# Patient Record
Sex: MALE | Race: WHITE | Employment: OTHER | URBAN - METROPOLITAN AREA
[De-identification: names, ages, dates, MRNs, and addresses within clinical notes are randomized per-mention and may not be internally consistent; named-entity substitution may affect disease eponyms.]

---

## 2020-11-03 ENCOUNTER — PATIENT OUTREACH (OUTPATIENT)
Dept: CARE COORDINATION | Facility: CLINIC | Age: 70
End: 2020-11-03

## 2020-11-03 NOTE — PROGRESS NOTES
Clinical Product Navigator RN reviewed chart; patient on payer product coverage.  Review results: noted patient is overdue for preventive care visit and care gaps.       UT/Voicemail    Clinical Data: Care Coordinator Outreach  Outreach attempted x 2.  Left message on patient's voicemail with call back information and requested return call.  Plan: Care Coordinator will send care coordination introduction letter with care coordinator contact information and explanation of care coordination services via mail. Care Coordinator will try to reach patient again in 3-5 business days.    Edna Kong RN/Clinical Product Navigator

## 2020-11-03 NOTE — LETTER
[facility]  [address]  [facility] [address]    November 3, 2020    Royal Adriano Duckworth  [address] [facility] [address]      Dear ,    I am a clinical product navigator that works on behalf of [employer] as a liaison between our clinical care teams and insurance health plans.  Our goal is to assure our patients have access to all of their primary and specialty care needs as well as additional system resources, such as: Diabetes Education, Medication Therapy Management, and Clinic Care Coordination, among other resources.     We noticed that you are due for your annual wellness visit.  Even if you are feeling well, we encourage you to have an annual check up and assure your health care needs stay on track.      Please contact the clinic at your convenience to schedule your annual wellness exam; if you are interested  or in need of establishing care with any specialty providers or resources including those described above, I am happy to assist you.    I look forward to helping you connect with providers within our health care system; please let me know if you have any questions.     Sincerely,    Edna Kong RN   Clinical Product Navigator  PH: [phone]

## 2021-04-26 ENCOUNTER — IMMUNIZATION (OUTPATIENT)
Dept: NURSING | Facility: CLINIC | Age: 71
End: 2021-04-26
Payer: COMMERCIAL

## 2021-04-26 PROCEDURE — 0001A PR COVID VAC PFIZER DIL RECON 30 MCG/0.3 ML IM: CPT

## 2021-04-26 PROCEDURE — 91300 PR COVID VAC PFIZER DIL RECON 30 MCG/0.3 ML IM: CPT

## 2021-05-17 ENCOUNTER — IMMUNIZATION (OUTPATIENT)
Dept: NURSING | Facility: CLINIC | Age: 71
End: 2021-05-17
Attending: INTERNAL MEDICINE
Payer: COMMERCIAL

## 2021-05-17 PROCEDURE — 91300 PR COVID VAC PFIZER DIL RECON 30 MCG/0.3 ML IM: CPT

## 2021-05-17 PROCEDURE — 0002A PR COVID VAC PFIZER DIL RECON 30 MCG/0.3 ML IM: CPT

## 2021-05-23 ENCOUNTER — HEALTH MAINTENANCE LETTER (OUTPATIENT)
Age: 71
End: 2021-05-23

## 2021-07-05 ENCOUNTER — OFFICE VISIT (OUTPATIENT)
Dept: URGENT CARE | Facility: URGENT CARE | Age: 71
End: 2021-07-05
Payer: COMMERCIAL

## 2021-07-05 VITALS
DIASTOLIC BLOOD PRESSURE: 72 MMHG | SYSTOLIC BLOOD PRESSURE: 126 MMHG | HEIGHT: 72 IN | TEMPERATURE: 98.4 F | WEIGHT: 175 LBS | BODY MASS INDEX: 23.7 KG/M2 | HEART RATE: 80 BPM

## 2021-07-05 DIAGNOSIS — L72.3 INFLAMED SEBACEOUS CYST: Primary | ICD-10-CM

## 2021-07-05 PROCEDURE — 99203 OFFICE O/P NEW LOW 30 MIN: CPT | Performed by: PHYSICIAN ASSISTANT

## 2021-07-05 RX ORDER — SACCHAROMYCES BOULARDII 250 MG
250 CAPSULE ORAL 2 TIMES DAILY
Qty: 60 CAPSULE | Refills: 0 | Status: SHIPPED | OUTPATIENT
Start: 2021-07-05 | End: 2021-08-04

## 2021-07-05 RX ORDER — CEPHALEXIN 500 MG/1
500 CAPSULE ORAL 3 TIMES DAILY
Qty: 21 CAPSULE | Refills: 0 | Status: SHIPPED | OUTPATIENT
Start: 2021-07-05 | End: 2021-07-12

## 2021-07-05 ASSESSMENT — ENCOUNTER SYMPTOMS
COLOR CHANGE: 1
CONSTITUTIONAL NEGATIVE: 1
WOUND: 1

## 2021-07-05 ASSESSMENT — MIFFLIN-ST. JEOR: SCORE: 1583.85

## 2021-07-05 NOTE — PROGRESS NOTES
"  Assessment & Plan   Problem List Items Addressed This Visit     None      Visit Diagnoses     Inflamed sebaceous cyst    -  Primary    Relevant Medications    cephALEXin (KEFLEX) 500 MG capsule    saccharomyces boulardii (FLORASTOR) 250 MG capsule         Take probiotic or yogurt (I recommend Greek yogurt) with this. Continue to apply moist heat to the area. Alternate Tylenol and Ibuprofen as needed for pain.        Tobacco Cessation:   reports that he has been smoking cigarettes. He has been smoking about 1.00 pack per day. He has never used smokeless tobacco.  Tobacco Cessation Action Plan: Shared Medical Visit / Group Education      Return in about 1 week (around 7/12/2021), or if symptoms worsen or fail to improve.    Krishna Molina PA-C  [facility]    is a 71 year old who presents for the following health issues  Patient presents with lump on neck. He reports in the past 2-3 days, the lump has become tender. He reports history of cysts. He has tried a heat pack to it which offered minimal relief.      Review of Systems   Constitutional: Negative.    Skin: Positive for color change and wound.          Objective    /72   Pulse 80   Temp 98.4  F (36.9  C) (Oral)   Ht 1.816 m (5' 11.5\")   Wt 79.4 kg (175 lb)   BMI 24.07 kg/m    Body mass index is 24.07 kg/m .  Physical Exam  Constitutional:       Appearance: Normal appearance.   HENT:      Head: Normocephalic and atraumatic.   Neck:      Musculoskeletal: Normal range of motion and neck supple.   Skin:     General: Skin is warm.      Findings: Abscess and erythema present.             Comments: Erythematous, firm to touch about 1-2 cm beyond central abscess   Neurological:      Mental Status: He is alert.            "

## 2021-07-05 NOTE — PATIENT INSTRUCTIONS
Take probiotic or yogurt (I recommend Greek yogurt) with this. Continue to apply moist heat to the area. Alternate Tylenol and Ibuprofen as needed for pain.

## 2021-09-12 ENCOUNTER — HEALTH MAINTENANCE LETTER (OUTPATIENT)
Age: 71
End: 2021-09-12

## 2021-12-28 ENCOUNTER — IMMUNIZATION (OUTPATIENT)
Dept: NURSING | Facility: CLINIC | Age: 71
End: 2021-12-28
Payer: COMMERCIAL

## 2021-12-28 PROCEDURE — 0004A PR COVID VAC PFIZER DIL RECON 30 MCG/0.3 ML IM: CPT

## 2021-12-28 PROCEDURE — 91300 PR COVID VAC PFIZER DIL RECON 30 MCG/0.3 ML IM: CPT

## 2022-06-19 ENCOUNTER — HEALTH MAINTENANCE LETTER (OUTPATIENT)
Age: 72
End: 2022-06-19

## 2022-10-14 ENCOUNTER — LAB (OUTPATIENT)
Dept: LAB | Facility: CLINIC | Age: 72
End: 2022-10-14
Payer: COMMERCIAL

## 2022-10-14 ENCOUNTER — OFFICE VISIT (OUTPATIENT)
Dept: FAMILY MEDICINE | Facility: CLINIC | Age: 72
End: 2022-10-14
Payer: COMMERCIAL

## 2022-10-14 VITALS
HEART RATE: 85 BPM | OXYGEN SATURATION: 98 % | SYSTOLIC BLOOD PRESSURE: 132 MMHG | DIASTOLIC BLOOD PRESSURE: 86 MMHG | BODY MASS INDEX: 24.55 KG/M2 | TEMPERATURE: 98.3 F | WEIGHT: 178.5 LBS

## 2022-10-14 DIAGNOSIS — Z83.3 FAMILY HISTORY OF DIABETES MELLITUS: ICD-10-CM

## 2022-10-14 DIAGNOSIS — Z13.220 SCREENING CHOLESTEROL LEVEL: ICD-10-CM

## 2022-10-14 DIAGNOSIS — Z12.11 SCREEN FOR COLON CANCER: Primary | ICD-10-CM

## 2022-10-14 DIAGNOSIS — L98.9 SKIN LESION: ICD-10-CM

## 2022-10-14 LAB — HBA1C MFR BLD: 5.3 % (ref 0–5.6)

## 2022-10-14 PROCEDURE — 80061 LIPID PANEL: CPT

## 2022-10-14 PROCEDURE — 36415 COLL VENOUS BLD VENIPUNCTURE: CPT

## 2022-10-14 PROCEDURE — 83036 HEMOGLOBIN GLYCOSYLATED A1C: CPT

## 2022-10-14 PROCEDURE — 99213 OFFICE O/P EST LOW 20 MIN: CPT

## 2022-10-14 ASSESSMENT — PAIN SCALES - GENERAL: PAINLEVEL: NO PAIN (0)

## 2022-10-14 NOTE — PROGRESS NOTES
Assessment & Plan     Screen for colon cancer  I advised patient about the recommendation for a colonoscopy after a positive fecal occult blood test.  Patient expressed a preference for watching and waiting over a colonoscopy.  I further advised him about the risks of deferring a colonoscopy till symptoms appear. Patient still expressed preference for watching and waiting approach. I advised him about the warning signs of colon cancer including a sudden change in bowel habits blood or blood in the stool.    Skin lesion  Unclear etiology for skin lesion.  I have a low suspicion for malignant lesion due to uniform color.  Differentials include cyst, seborrheic keratosis, actinic keratosis, or nevi.  Referral to Derm for further management  - Adult Dermatology Referral; Future    Family history of diabetes mellitus  - Hemoglobin A1c; Future    Screening cholesterol level  - Lipid panel reflex to direct LDL Non-fasting; Future  56}  Return in about 3 months (around 1/14/2023).    Wayne Miguel NP  [facility] KATLIN Harrington is a 72 year old presenting for the following health issues:  Results    Had positive screening on the Feccal occult blood test. He is wondering about the follow up for this, and states he would prefer not to do a colonoscopy.    Skin marks on face. History of moles. Been around for 5-6 months. Itching on the nose lesion. 2 small cysts removed on neck within 5 years. Hasn't changed over 5-6 months. Hasn't tried any OTCs. History of sun exposure as a child.    A1c 5.9% according to biometric labs performed by home health nurse. He reports that he doesn't remember blood getting drawn and would like his blood rechecked today.    No chest pain, shortness of breath, lightheadedness.  History of Raynauds and he reports that bp is typically worse in the right arm.     History of Present Illness       Reason for visit:  Follow Up on Results    He eats 0-1 servings of fruits  and vegetables daily.He consumes 0 sweetened beverage(s) daily. He exercises with enough effort to increase his heart rate 3 or less days per week.        Review of Systems   Constitutional, HEENT, cardiovascular, pulmonary, gi and gu systems are negative, except as otherwise noted.      Objective    /86 (BP Location: Left arm, Patient Position: Sitting, Cuff Size: Adult Regular)   Pulse 85   Temp 98.3  F (36.8  C) (Temporal)   Wt 81 kg (178 lb 8 oz)   SpO2 98%   BMI 24.55 kg/m    Body mass index is 24.55 kg/m .  Physical Exam   GENERAL: healthy, alert and no distress  RESP: lungs clear to auscultation - no rales, rhonchi or wheezes  CV:  regular rate and rhythm, normal S1 S2, no S3 or S4, no murmur, click or rub, no peripheral edema and peripheral pulses strong. No carotid bruits.  MS: no gross musculoskeletal defects noted, no edema  SKIN: Approximately 3 mm papule on left nasal bridge: Appears uniform and white in color.  1 mm papule on left cheek white in color and uniform shape.  NEURO: Normal strength and tone, mentation intact and speech normal  PSYCH: mentation appears normal, affect normal/bright

## 2022-10-14 NOTE — PATIENT INSTRUCTIONS
Watch for change in bowel habits: constipation, diarrhea, blood in poop.     Choose complex carbs over single carbs: whole grains, brown rice, beans, vegetables.

## 2022-10-15 LAB
CHOLEST SERPL-MCNC: 184 MG/DL
FASTING STATUS PATIENT QL REPORTED: YES
HDLC SERPL-MCNC: 60 MG/DL
LDLC SERPL CALC-MCNC: 103 MG/DL
NONHDLC SERPL-MCNC: 124 MG/DL
TRIGL SERPL-MCNC: 105 MG/DL

## 2022-11-19 ENCOUNTER — HEALTH MAINTENANCE LETTER (OUTPATIENT)
Age: 72
End: 2022-11-19

## 2023-01-25 ENCOUNTER — TELEPHONE (OUTPATIENT)
Dept: FAMILY MEDICINE | Facility: CLINIC | Age: 73
End: 2023-01-25

## 2023-01-25 NOTE — TELEPHONE ENCOUNTER
Patient Quality Outreach    Patient is due for the following:   Colon Cancer Screening  Physical Annual Wellness Visit      Topic Date Due     Pneumococcal Vaccine (1 - PCV) Never done     Zoster (Shingles) Vaccine (1 of 2) Never done     COVID-19 Vaccine (5 - Booster for Pfizer series) 02/22/2022     Flu Vaccine (1) Never done       Next Steps:   Schedule a Annual Wellness Visit    Type of outreach:    Sent [facility] message.    Next Steps:  Reach out within 90 days via Letter.    Max number of attempts reached: No. Will try again in 90 days if patient still on fail list.    Questions for provider review:    None     NIRU CEDENO, CHONG  Chart routed to Care Team.

## 2023-03-23 ENCOUNTER — OFFICE VISIT (OUTPATIENT)
Dept: DERMATOLOGY | Facility: CLINIC | Age: 73
End: 2023-03-23
Payer: COMMERCIAL

## 2023-03-23 DIAGNOSIS — L57.0 ACTINIC KERATOSIS: Primary | ICD-10-CM

## 2023-03-23 DIAGNOSIS — L82.1 SEBORRHEIC KERATOSES: ICD-10-CM

## 2023-03-23 DIAGNOSIS — L81.4 SOLAR LENTIGO: ICD-10-CM

## 2023-03-23 PROCEDURE — 99203 OFFICE O/P NEW LOW 30 MIN: CPT | Mod: GC | Performed by: STUDENT IN AN ORGANIZED HEALTH CARE EDUCATION/TRAINING PROGRAM

## 2023-03-23 ASSESSMENT — PAIN SCALES - GENERAL: PAINLEVEL: NO PAIN (0)

## 2023-03-23 NOTE — PROGRESS NOTES
[facility] Dermatology Note  Encounter Date: Mar 23, 2023  Office Visit     Dermatology Problem List:  1. AKs, forehead  2. Seborrheic keratoses  ____________________________________________    Assessment & Plan:     # Seborrheic keratosis, left cheek  - Discussed the natural history and benign nature of these lesions. Reassurance provided that no additional treatment is necessary, however cryotherapy can be performed if inflamed/irritated or bothersome to patient.     # Actinic keratosis  Discussed that these are pre-cancerous lesions and 10% of them will go on to become non-melanoma skin cancers over their next 10 years of life, which is why we opt treat them.   - Cryotherapy offered to the patient today however he was hesitant about this, therefore will re-assess at follow up skin check  - Encouraged daily sun protection with SPF 30+ and UPF clothing    # Solar lentigines  Discussed that these are benign pigmented macules due to ultraviolet radiation exposure, usually from the sun. These are commonly found on fair-skinned individuals. Sun protection with SPF 30+, UPF clothing, seeking shade and tanning bed avoidance is important to avoid development of new lesions. Patient informed to notify us of any changes to these lesions.     Procedures Performed:   None    Follow-up: in 3-6 months for skin check, sooner as needed for new or changing lesions    Staff and Resident:     Helen Hess DO (PGY-4)  Dermatology Resident  [facility]    Staff: Dr. Rj Rene  ____________________________________________    CC: Derm Problem (Spot of concern on the nose (itchy, was there for several months but it disappeared now) and left cheek (white, odd mole.  )    HPI:  Mr. Royal Adriano Duckworth is a(n) 73 year old male who presents today as a new patient for evaluation of a lesion on the left side of his nose under where his glasses typically sit.  He initially noticed it 3 to 4 months ago and  states that it was itchy but never painful and never bled and about a month ago it flaked off on its own and has not recurred.  He was not concerned by this but was referred to us by his PCP.  He also wanted us to evaluate a spot on the left cheek that was white in color however is also asymptomatic.  He denies any family history of skin cancer, specifically melanoma and also denies any personal history of skin cancer.    Patient is otherwise feeling well, without additional skin concerns.    Labs Reviewed:  N/A    Physical Exam:  Vitals: There were no vitals taken for this visit.  SKIN: Focused examination of the face was performed.  - There are erythematous macules with overyling adherent scale on the upper forehead.   - Scattered brown macules on sun exposed areas of the face.   - There is a waxy stuck on light tan papule on the left malar cheek.   - No other lesions of concern on areas examined.     Medications:  Current Outpatient Medications   Medication     VIAGRA 100 MG OR TABS     No current facility-administered medications for this visit.      Past Medical History:   Patient Active Problem List   Diagnosis     Impotence of organic origin     CARDIOVASCULAR SCREENING; LDL GOAL LESS THAN 160     Advanced directives, counseling/discussion     Past Medical History:   Diagnosis Date     NO ACTIVE PROBLEMS        NORRIS Miguel NP  [address] [place] [address] on close of this encounter.

## 2023-03-23 NOTE — LETTER
3/23/2023       RE: Royal Adriano Duckworth  [address]  [facility] [address]     Dear Colleague,    Thank you for referring your patient, Royal Adriano Duckworth, to the [facility] DERMATOLOG[facility] [place] [facility]. Please see a copy of my visit note below.    [facility] Dermatology Note  Encounter Date: Mar 23, 2023  Office Visit     Dermatology Problem List:  1. AKs, forehead  2. Seborrheic keratoses  ____________________________________________    Assessment & Plan:     # Seborrheic keratosis, left cheek  - Discussed the natural history and benign nature of these lesions. Reassurance provided that no additional treatment is necessary, however cryotherapy can be performed if inflamed/irritated or bothersome to patient.     # Actinic keratosis  Discussed that these are pre-cancerous lesions and 10% of them will go on to become non-melanoma skin cancers over their next 10 years of life, which is why we opt treat them.   - Cryotherapy offered to the patient today however he was hesitant about this, therefore will re-assess at follow up skin check  - Encouraged daily sun protection with SPF 30+ and UPF clothing    # Solar lentigines  Discussed that these are benign pigmented macules due to ultraviolet radiation exposure, usually from the sun. These are commonly found on fair-skinned individuals. Sun protection with SPF 30+, UPF clothing, seeking shade and tanning bed avoidance is important to avoid development of new lesions. Patient informed to notify us of any changes to these lesions.     Procedures Performed:   None    Follow-up: in 3-6 months for skin check, sooner as needed for new or changing lesions    Staff and Resident:     Helen Hess DO (PGY-4)  Dermatology Resident  [facility]    Staff: Dr. Rj Rene  ____________________________________________    CC: Derm Problem (Spot of concern on the nose  (itchy, was there for several months but it disappeared now) and left cheek (white, odd mole.  )    HPI:  Mr. Royal Adriano Duckworth is a(n) 73 year old male who presents today as a new patient for evaluation of a lesion on the left side of his nose under where his glasses typically sit.  He initially noticed it 3 to 4 months ago and states that it was itchy but never painful and never bled and about a month ago it flaked off on its own and has not recurred.  He was not concerned by this but was referred to us by his PCP.  He also wanted us to evaluate a spot on the left cheek that was white in color however is also asymptomatic.  He denies any family history of skin cancer, specifically melanoma and also denies any personal history of skin cancer.    Patient is otherwise feeling well, without additional skin concerns.    Labs Reviewed:  N/A    Physical Exam:  Vitals: There were no vitals taken for this visit.  SKIN: Focused examination of the face was performed.  - There are erythematous macules with overyling adherent scale on the upper forehead.   - Scattered brown macules on sun exposed areas of the face.   - There is a waxy stuck on light tan papule on the left malar cheek.   - No other lesions of concern on areas examined.     Medications:  Current Outpatient Medications   Medication     VIAGRA 100 MG OR TABS     No current facility-administered medications for this visit.      Past Medical History:   Patient Active Problem List   Diagnosis     Impotence of organic origin     CARDIOVASCULAR SCREENING; LDL GOAL LESS THAN 160     Advanced directives, counseling/discussion     Past Medical History:   Diagnosis Date     NO ACTIVE PROBLEMS        NORRIS Miguel NP  [address] [address] on close of this encounter.    Attestation signed by Rj Rene MD at 3/23/2023  3:35 PM:  I have personally examined this patient and agree with the resident doctor's documentation and plan of care. I have  reviewed and amended the resident's note. The documentation accurately reflects my clinical observations, diagnoses, treatment and follow-up plans.     Rj Rene MD  Dermatology Staff

## 2023-03-23 NOTE — PATIENT INSTRUCTIONS
Patient Education     Checking for Skin Cancer  You can find cancer early by checking your skin each month. There are 3 kinds of skin cancer. They are melanoma, basal cell carcinoma, and squamous cell carcinoma. Doing monthly skin checks is the best way to find new marks or skin changes. Follow the instructions below for checking your skin.   The ABCDEs of checking moles for melanoma   Check your moles or growths for signs of melanoma using ABCDE:   Asymmetry: the sides of the mole or growth don t match  Border: the edges are ragged, notched, or blurred  Color: the color within the mole or growth varies  Diameter: the mole or growth is larger than 6 mm (size of a pencil eraser)  Evolving: the size, shape, or color of the mole or growth is changing (evolving is not shown in the images below)    Checking for other types of skin cancer  Basal cell carcinoma or squamous cell carcinoma have symptoms such as:     A spot or mole that looks different from all other marks on your skin  Changes in how an area feels, such as itching, tenderness, or pain  Changes in the skin's surface, such as oozing, bleeding, or scaliness  A sore that does not heal  New swelling or redness beyond the border of a mole    Who s at risk?  Anyone can get skin cancer. But you are at greater risk if you have:   Fair skin, light-colored hair, or light-colored eyes  Many moles or abnormal moles on your skin  A history of sunburns from sunlight or tanning beds  A family history of skin cancer  A history of exposure to radiation or chemicals  A weakened immune system  If you have had skin cancer in the past, you are at risk for recurring skin cancer.   How to check your skin  Do your monthly skin checkups in front of a full-length mirror. Check all parts of your body, including your:   Head (ears, face, neck, and scalp)  Torso (front, back, and sides)  Arms (tops, undersides, upper, and lower armpits)  Hands (palms, backs, and fingers, including under  the nails)  Buttocks and genitals  Legs (front, back, and sides)  Feet (tops, soles, toes, including under the nails, and between toes)  If you have a lot of moles, take digital photos of them each month. Make sure to take photos both up close and from a distance. These can help you see if any moles change over time.   Most skin changes are not cancer. But if you see any changes in your skin, call your doctor right away. Only he or she can diagnose a problem. If you have skin cancer, seeing your doctor can be the first step toward getting the treatment that could save your life.   [employer] last reviewed this educational content on 4/1/2019 2000-2020 [employer]. [address] [place] [address], [place] [address]. All rights reserved. This information is not intended as a substitute for professional medical care. Always follow your healthcare professional's instructions.       When should I call my doctor?  If you are worsening or not improving, please, contact us or seek urgent care as noted below.     Who should I call with questions (adults)?  [facility] (adult and pediatric): [phone]  [facility] (adult): [phone]  For urgent needs outside of business hours call the [facility] at [phone] and ask for the dermatology resident on call to be paged  If this is a medical emergency and you are unable to reach an ER, Call [phone]    Who should I call with questions (pediatric)?  [facility]- Pediatric Dermatology  Dr. Ramandeep Arroyo, Dr. Simba Hector, Dr. Lamar Bai, CHERELLE Briscoe, Dr. Neena Flynn, Dr. Yarelis Carrillo & Dr. Deangelo Sutton  Non-urgent nurse triage line; [phone]- Fatuma and Radha SCHWARZ Care Coordinators   [place] (/Complex ) [phone]    If you need a prescription refill, please contact your pharmacy. Refills are approved or denied by our Physicians  during normal business hours, Monday through Fridays  Per office policy, refills will not be granted if you have not been seen within the past year (or sooner depending on your child's condition)    Scheduling Information:  Pediatric Appointment Scheduling and Call Center [phone]  Radiology Scheduling- [phone]  Sedation Unit Scheduling- [phone]  [facility] Scheduling- General [phone]; Pediatric Dermatology [phone]  Main  Services: [phone]  Bolivian: [phone]  Jordanian: [phone]  Hmong/Alejandro/Deandre: [phone]  Preadmission Nursing Department Fax Number: [phone] (Fax all pre-operative paperwork to this number)    For urgent matters arising during evenings, weekends, or holidays that cannot wait for normal business hours please call [phone] and ask for the dermatology resident on call to be paged.

## 2023-03-23 NOTE — NURSING NOTE
Dermatology Rooming Note    Royal Adriano Duckworth's goals for this visit include:   Chief Complaint   Patient presents with     Derm Problem     Spot of concern on the nose (itchy, was there for several months but it disappeared now) and left cheek (white, odd mole.       Cristina Carter, CMA

## 2023-07-01 ENCOUNTER — HEALTH MAINTENANCE LETTER (OUTPATIENT)
Age: 73
End: 2023-07-01

## 2023-07-18 ENCOUNTER — OFFICE VISIT (OUTPATIENT)
Dept: DERMATOLOGY | Facility: CLINIC | Age: 73
End: 2023-07-18
Payer: COMMERCIAL

## 2023-07-18 DIAGNOSIS — L81.4 LENTIGINES: ICD-10-CM

## 2023-07-18 DIAGNOSIS — L82.1 SEBORRHEIC KERATOSES: ICD-10-CM

## 2023-07-18 DIAGNOSIS — L57.8 ACTINIC SKIN DAMAGE: Primary | ICD-10-CM

## 2023-07-18 PROCEDURE — 99213 OFFICE O/P EST LOW 20 MIN: CPT | Performed by: STUDENT IN AN ORGANIZED HEALTH CARE EDUCATION/TRAINING PROGRAM

## 2023-07-18 NOTE — PROGRESS NOTES
[facility] Dermatology Note    Encounter Date: Jul 18, 2023    Dermatology Problem List:    ______________________________________    Impression/Plan:   was seen today for derm problem.    Diagnoses and all orders for this visit:    Actinic skin damage  Lentigines  - discussed sunprotective behaviors, clothing, and the use of sunscreen    Seborrheic keratoses  - benign          Follow-up PRN.       Staff Involved:  Staff Only    Rj Rene MD   of Dermatology  Department of Dermatology  [facility]      CC:   Chief Complaint   Patient presents with     Derm Problem     AK follow up with area of concern around nose.       History of Present Illness:  Mr. Royal Adriano Duckworth is a 73 year old male who presents as a return patient.    Last seen in march, aks were not frozen after discussion of the low potential for malignant transformation.  Patient presents today for follow-up of his loose lesions.  They seem to have resolved.  He wears sunscreen occasionally    Labs:      Physical exam:  Vitals: There were no vitals taken for this visit.  GEN: well developed, well-nourished, in no acute distress, in a pleasant mood.     SKIN: Frank phototype 2  - Sun-exposed skin, which includes the head/face, neck, both arms, digits, and/or nails was examined.   - Flat brown macules and patches in a sun exposed areas on face and extremities  - Stuck on brown papules on trunk and extremities   - No other lesions of concern on areas examined.     Past Medical History:   Past Medical History:   Diagnosis Date     NO ACTIVE PROBLEMS      Past Surgical History:   Procedure Laterality Date     ZZC APPENDECTOMY         Social History:   reports that he has been smoking cigarettes. He has been smoking an average of 1 pack per day. He has never used smokeless tobacco. He reports current alcohol use. He reports that he does not use drugs.    Family  History:  Family History   Problem Relation Age of Onset     C.A.D. Mother         triple bypass 10yrs ago     Diabetes Mother      Eye Disorder Mother         poss     Respiratory Father         poss lung CA     Diabetes Brother      Melanoma No family hx of      Skin Cancer No family hx of        Medications:  Current Outpatient Medications   Medication Sig Dispense Refill     VIAGRA 100 MG OR TABS 1 TABLET 30 MINUTES PRIOR TO INTERCOURSE 5 5     No Known Allergies

## 2023-07-18 NOTE — NURSING NOTE
Dermatology Rooming Note    Royal Adriano Duckworth's goals for this visit include:   Chief Complaint   Patient presents with     Derm Problem     AK follow up with area of concern around nose.     Brian Adams, EMT-B

## 2023-07-18 NOTE — LETTER
7/18/2023       RE: Royal Adriano Duckworth  [address]  [facility] [address]     Dear Colleague,    Thank you for referring your patient, Royal Adriano Duckworth, to the [facility] DERMATOLOGY [facility] [place] at [facility]. Please see a copy of my visit note below.    [facility] Dermatology Note    Encounter Date: Jul 18, 2023    Dermatology Problem List:    ______________________________________    Impression/Plan:   was seen today for derm problem.    Diagnoses and all orders for this visit:    Actinic skin damage  Lentigines  - discussed sunprotective behaviors, clothing, and the use of sunscreen    Seborrheic keratoses  - benign          Follow-up PRN.       Staff Involved:  Staff Only    Rj Rene MD   of Dermatology  Department of Dermatology  [facility] [employer] [facility]      CC:   Chief Complaint   Patient presents with    Derm Problem     AK follow up with area of concern around nose.       History of Present Illness:  Mr. Royal Adriano Duckworth is a 73 year old male who presents as a return patient.    Last seen in march, aks were not frozen after discussion of the low potential for malignant transformation.  Patient presents today for follow-up of his loose lesions.  They seem to have resolved.  He wears sunscreen occasionally    Labs:      Physical exam:  Vitals: There were no vitals taken for this visit.  GEN: well developed, well-nourished, in no acute distress, in a pleasant mood.     SKIN: Frank phototype 2  - Sun-exposed skin, which includes the head/face, neck, both arms, digits, and/or nails was examined.   - Flat brown macules and patches in a sun exposed areas on face and extremities  - Stuck on brown papules on trunk and extremities   - No other lesions of concern on areas examined.     Past Medical History:   Past Medical History:   Diagnosis Date    NO ACTIVE PROBLEMS       Past Surgical History:   Procedure Laterality Date    ZZC APPENDECTOMY         Social History:   reports that he has been smoking cigarettes. He has been smoking an average of 1 pack per day. He has never used smokeless tobacco. He reports current alcohol use. He reports that he does not use drugs.    Family History:  Family History   Problem Relation Age of Onset    C.A.D. Mother         triple bypass 10yrs ago    Diabetes Mother     Eye Disorder Mother         poss    Respiratory Father         poss lung CA    Diabetes Brother     Melanoma No family hx of     Skin Cancer No family hx of        Medications:  Current Outpatient Medications   Medication Sig Dispense Refill    VIAGRA 100 MG OR TABS 1 TABLET 30 MINUTES PRIOR TO INTERCOURSE 5 5     No Known Allergies

## 2024-05-31 ENCOUNTER — OFFICE VISIT (OUTPATIENT)
Dept: FAMILY MEDICINE | Facility: CLINIC | Age: 74
End: 2024-05-31
Payer: COMMERCIAL

## 2024-05-31 VITALS
OXYGEN SATURATION: 95 % | HEART RATE: 98 BPM | SYSTOLIC BLOOD PRESSURE: 152 MMHG | TEMPERATURE: 98.2 F | DIASTOLIC BLOOD PRESSURE: 80 MMHG | BODY MASS INDEX: 24.92 KG/M2 | RESPIRATION RATE: 20 BRPM | HEIGHT: 71 IN | WEIGHT: 178 LBS

## 2024-05-31 DIAGNOSIS — L08.9 SKIN INFECTION: Primary | ICD-10-CM

## 2024-05-31 DIAGNOSIS — R03.0 ELEVATED BLOOD PRESSURE READING WITHOUT DIAGNOSIS OF HYPERTENSION: ICD-10-CM

## 2024-05-31 PROCEDURE — 87798 DETECT AGENT NOS DNA AMP: CPT | Mod: 90 | Performed by: FAMILY MEDICINE

## 2024-05-31 PROCEDURE — G2211 COMPLEX E/M VISIT ADD ON: HCPCS | Performed by: FAMILY MEDICINE

## 2024-05-31 PROCEDURE — 87077 CULTURE AEROBIC IDENTIFY: CPT | Performed by: FAMILY MEDICINE

## 2024-05-31 PROCEDURE — 87205 SMEAR GRAM STAIN: CPT | Performed by: FAMILY MEDICINE

## 2024-05-31 PROCEDURE — 99214 OFFICE O/P EST MOD 30 MIN: CPT | Performed by: FAMILY MEDICINE

## 2024-05-31 PROCEDURE — 87070 CULTURE OTHR SPECIMN AEROBIC: CPT | Performed by: FAMILY MEDICINE

## 2024-05-31 PROCEDURE — 99000 SPECIMEN HANDLING OFFICE-LAB: CPT | Performed by: FAMILY MEDICINE

## 2024-05-31 RX ORDER — SULFAMETHOXAZOLE/TRIMETHOPRIM 800-160 MG
1 TABLET ORAL 2 TIMES DAILY
Qty: 14 TABLET | Refills: 0 | Status: SHIPPED | OUTPATIENT
Start: 2024-05-31 | End: 2024-06-07

## 2024-05-31 RX ORDER — RESPIRATORY SYNCYTIAL VIRUS VACCINE 120MCG/0.5
0.5 KIT INTRAMUSCULAR ONCE
Qty: 1 EACH | Refills: 0 | Status: CANCELLED | OUTPATIENT
Start: 2024-05-31 | End: 2024-05-31

## 2024-05-31 NOTE — PATIENT INSTRUCTIONS
Bactrim one tablet twice daily for 7 days  Ice pack three to four times daily for 15 minutes  Please follow up in the emergency room if symptoms are not improving in two days or sooner if you experience fever, chills or other concerning symptoms

## 2024-05-31 NOTE — PROGRESS NOTES
Assessment & Plan     Skin infection  - d/d bacterial vs viral vs other   - advised below   - sulfamethoxazole-trimethoprim (BACTRIM DS) 800-160 MG tablet; Take 1 tablet by mouth 2 times daily for 7 days  - Varicella Zoster Virus by PCR Blood Fluid or Tissue; Future  - Skin Aerobic Bacterial Culture Routine With Gram Stain  - Varicella Zoster Virus by PCR Blood Fluid or Tissue    Elevated blood pressure without diagnosis of HTN   - advised to follow up for physical to recheck b/p          Patient Instructions   Bactrim one tablet twice daily for 7 days  Ice pack three to four times daily for 15 minutes  Please follow up in the emergency room if symptoms are not improving in two days or sooner if you experience fever, chills or other concerning symptoms     Humphrey Harrington is a 74 year old, presenting for the following health issues:  Facial Swelling      5/31/2024     3:55 PM   Additional Questions   Roomed by tavia   Accompanied by self     History of Present Illness       Reason for visit:  Dont know, thats what i want to find out, maybe an insect bite or some infection causing swelling. Forehead.    He eats 0-1 servings of fruits and vegetables daily.He consumes 0 sweetened beverage(s) daily.He exercises with enough effort to increase his heart rate 9 or less minutes per day.  He exercises with enough effort to increase his heart rate 3 or less days per week.   He is taking medications regularly.       Yesterday morning he had this round spot on his forehead. Throughout the day it kept feeling moist. Through the night it was draining. Around six am he noticed that forehead was slightly more swollen. He called nurse line and advised to put cold compress. The cold compress stopped the weeping. Weeping was clear. He kind of feels a start of a headache. No fever, chills or lymph node. He is unsure of any insect bite. Lesion is tender.               Objective    There were no vitals taken for this visit.  There is  "no height or weight on file to calculate BMI.  Physical Exam   BP (!) 159/82 (BP Location: Right arm, Patient Position: Sitting, Cuff Size: Adult Regular)   Pulse 98   Temp 98.2  F (36.8  C) (Temporal)   Resp 20   Ht 1.8 m (5' 10.87\")   Wt 80.7 kg (178 lb)   SpO2 95%   BMI 24.92 kg/m     BP (!) 152/80 (BP Location: Right arm, Patient Position: Sitting, Cuff Size: Adult Regular)   Pulse 98   Temp 98.2  F (36.8  C) (Temporal)   Resp 20   Ht 1.8 m (5' 10.87\")   Wt 80.7 kg (178 lb)   SpO2 95%   BMI 24.92 kg/m                           Signed Electronically by: Catherine Li MD    "

## 2024-06-02 LAB
BACTERIA SKIN AEROBE CULT: ABNORMAL
BACTERIA SKIN AEROBE CULT: ABNORMAL
GRAM STAIN RESULT: ABNORMAL
GRAM STAIN RESULT: ABNORMAL

## 2024-06-06 LAB — VZV DNA SPEC QL NAA+PROBE: NOT DETECTED

## 2024-08-24 ENCOUNTER — HEALTH MAINTENANCE LETTER (OUTPATIENT)
Age: 74
End: 2024-08-24

## 2024-12-26 ENCOUNTER — APPOINTMENT (OUTPATIENT)
Dept: CARDIOLOGY | Facility: CLINIC | Age: 74
DRG: 193 | End: 2024-12-26
Payer: COMMERCIAL

## 2024-12-26 ENCOUNTER — HOSPITAL ENCOUNTER (INPATIENT)
Facility: CLINIC | Age: 74
End: 2024-12-26
Attending: EMERGENCY MEDICINE | Admitting: INTERNAL MEDICINE
Payer: COMMERCIAL

## 2024-12-26 ENCOUNTER — APPOINTMENT (OUTPATIENT)
Dept: CT IMAGING | Facility: CLINIC | Age: 74
DRG: 193 | End: 2024-12-26
Attending: EMERGENCY MEDICINE
Payer: COMMERCIAL

## 2024-12-26 ENCOUNTER — APPOINTMENT (OUTPATIENT)
Dept: GENERAL RADIOLOGY | Facility: CLINIC | Age: 74
DRG: 193 | End: 2024-12-26
Payer: COMMERCIAL

## 2024-12-26 DIAGNOSIS — J96.01 ACUTE HYPOXEMIC RESPIRATORY FAILURE (H): ICD-10-CM

## 2024-12-26 DIAGNOSIS — I48.91 ATRIAL FIBRILLATION, UNSPECIFIED TYPE (H): ICD-10-CM

## 2024-12-26 DIAGNOSIS — R06.2 WHEEZE: Primary | ICD-10-CM

## 2024-12-26 DIAGNOSIS — I44.7 LBBB (LEFT BUNDLE BRANCH BLOCK): ICD-10-CM

## 2024-12-26 DIAGNOSIS — I48.0 PAROXYSMAL A-FIB (H): ICD-10-CM

## 2024-12-26 DIAGNOSIS — I10 ESSENTIAL HYPERTENSION, BENIGN: ICD-10-CM

## 2024-12-26 DIAGNOSIS — I47.10 SVT (SUPRAVENTRICULAR TACHYCARDIA) (H): Primary | ICD-10-CM

## 2024-12-26 DIAGNOSIS — I50.20 HEART FAILURE WITH REDUCED EJECTION FRACTION, NYHA CLASS III (H): ICD-10-CM

## 2024-12-26 DIAGNOSIS — J18.9 PNEUMONIA DUE TO INFECTIOUS ORGANISM, UNSPECIFIED LATERALITY, UNSPECIFIED PART OF LUNG: ICD-10-CM

## 2024-12-26 DIAGNOSIS — J10.1 INFLUENZA A: ICD-10-CM

## 2024-12-26 DIAGNOSIS — I25.10 CORONARY ARTERY DISEASE INVOLVING NATIVE CORONARY ARTERY OF NATIVE HEART WITHOUT ANGINA PECTORIS: ICD-10-CM

## 2024-12-26 DIAGNOSIS — F17.210 CIGARETTE SMOKER: ICD-10-CM

## 2024-12-26 DIAGNOSIS — Z13.6 CARDIOVASCULAR SCREENING; LDL GOAL LESS THAN 160: ICD-10-CM

## 2024-12-26 DIAGNOSIS — I49.1 APC (ATRIAL PREMATURE CONTRACTIONS): ICD-10-CM

## 2024-12-26 LAB
ALBUMIN SERPL BCG-MCNC: 4.1 G/DL (ref 3.5–5.2)
ALBUMIN UR-MCNC: NEGATIVE MG/DL
ALLEN'S TEST: YES
ALP SERPL-CCNC: 105 U/L (ref 40–150)
ALT SERPL W P-5'-P-CCNC: 24 U/L (ref 0–70)
ANION GAP SERPL CALCULATED.3IONS-SCNC: 13 MMOL/L (ref 7–15)
ANION GAP SERPL CALCULATED.3IONS-SCNC: 13 MMOL/L (ref 7–15)
ANION GAP SERPL CALCULATED.3IONS-SCNC: 22 MMOL/L (ref 7–15)
APPEARANCE UR: CLEAR
APTT PPP: 30 SECONDS (ref 22–38)
AST SERPL W P-5'-P-CCNC: 28 U/L (ref 0–45)
ATRIAL RATE - MUSE: 122 BPM
ATRIAL RATE - MUSE: 125 BPM
ATRIAL RATE - MUSE: 170 BPM
ATRIAL RATE - MUSE: 86 BPM
ATRIAL RATE - MUSE: 98 BPM
B-OH-BUTYR SERPL-SCNC: 0.6 MMOL/L
BACTERIA BLD CULT: NORMAL
BACTERIA BLD CULT: NORMAL
BASE EXCESS BLDA CALC-SCNC: -5.6 MMOL/L (ref -3–3)
BASE EXCESS BLDV CALC-SCNC: -8.7 MMOL/L (ref -3–3)
BASE EXCESS BLDV CALC-SCNC: -9 MMOL/L (ref -3–3)
BASE EXCESS BLDV CALC-SCNC: -9 MMOL/L (ref -3–3)
BASE EXCESS BLDV CALC-SCNC: 0.7 MMOL/L (ref -3–3)
BASOPHILS # BLD AUTO: 0.1 10E3/UL (ref 0–0.2)
BASOPHILS NFR BLD AUTO: 1 %
BILIRUB SERPL-MCNC: 0.3 MG/DL
BILIRUB UR QL STRIP: NEGATIVE
BUN SERPL-MCNC: 11.1 MG/DL (ref 8–23)
BUN SERPL-MCNC: 12.7 MG/DL (ref 8–23)
BUN SERPL-MCNC: 13 MG/DL (ref 8–23)
C PNEUM DNA SPEC QL NAA+PROBE: NOT DETECTED
CA-I BLD-MCNC: 4.9 MG/DL (ref 4.4–5.2)
CALCIUM SERPL-MCNC: 8 MG/DL (ref 8.8–10.4)
CALCIUM SERPL-MCNC: 8.2 MG/DL (ref 8.8–10.4)
CALCIUM SERPL-MCNC: 9 MG/DL (ref 8.8–10.4)
CHLORIDE SERPL-SCNC: 101 MMOL/L (ref 98–107)
CHLORIDE SERPL-SCNC: 95 MMOL/L (ref 98–107)
CHLORIDE SERPL-SCNC: 99 MMOL/L (ref 98–107)
COLOR UR AUTO: ABNORMAL
CPB POCT: NO
CREAT SERPL-MCNC: 0.95 MG/DL (ref 0.67–1.17)
CREAT SERPL-MCNC: 1.08 MG/DL (ref 0.67–1.17)
CREAT SERPL-MCNC: 1.13 MG/DL (ref 0.67–1.17)
CRP SERPL-MCNC: 31.99 MG/L
D DIMER PPP FEU-MCNC: 1.29 UG/ML FEU (ref 0–0.5)
DIASTOLIC BLOOD PRESSURE - MUSE: NORMAL MMHG
EGFRCR SERPLBLD CKD-EPI 2021: 69 ML/MIN/1.73M2
EGFRCR SERPLBLD CKD-EPI 2021: 72 ML/MIN/1.73M2
EGFRCR SERPLBLD CKD-EPI 2021: 85 ML/MIN/1.73M2
EOSINOPHIL # BLD AUTO: 0 10E3/UL (ref 0–0.7)
EOSINOPHIL NFR BLD AUTO: 0 %
ERYTHROCYTE [DISTWIDTH] IN BLOOD BY AUTOMATED COUNT: 12.7 % (ref 10–15)
EST. AVERAGE GLUCOSE BLD GHB EST-MCNC: 111 MG/DL
ETHANOL SERPL-MCNC: <0.01 G/DL
FLUAV H1 2009 PAND RNA SPEC QL NAA+PROBE: DETECTED
FLUAV H1 RNA SPEC QL NAA+PROBE: NOT DETECTED
FLUAV H3 RNA SPEC QL NAA+PROBE: NOT DETECTED
FLUAV RNA SPEC QL NAA+PROBE: DETECTED
FLUAV RNA SPEC QL NAA+PROBE: POSITIVE
FLUBV RNA RESP QL NAA+PROBE: NEGATIVE
FLUBV RNA SPEC QL NAA+PROBE: NOT DETECTED
GLUCOSE BLD-MCNC: 283 MG/DL (ref 70–99)
GLUCOSE BLDC GLUCOMTR-MCNC: 103 MG/DL (ref 70–99)
GLUCOSE BLDC GLUCOMTR-MCNC: 175 MG/DL (ref 70–99)
GLUCOSE BLDC GLUCOMTR-MCNC: 179 MG/DL (ref 70–99)
GLUCOSE BLDC GLUCOMTR-MCNC: 193 MG/DL (ref 70–99)
GLUCOSE BLDC GLUCOMTR-MCNC: 281 MG/DL (ref 70–99)
GLUCOSE SERPL-MCNC: 165 MG/DL (ref 70–99)
GLUCOSE SERPL-MCNC: 206 MG/DL (ref 70–99)
GLUCOSE SERPL-MCNC: 285 MG/DL (ref 70–99)
GLUCOSE UR STRIP-MCNC: 300 MG/DL
HADV DNA SPEC QL NAA+PROBE: NOT DETECTED
HBA1C MFR BLD: 5.5 %
HCO3 BLD-SCNC: 21 MMOL/L (ref 21–28)
HCO3 BLDV-SCNC: 21 MMOL/L (ref 21–28)
HCO3 BLDV-SCNC: 22 MMOL/L (ref 21–28)
HCO3 BLDV-SCNC: 22 MMOL/L (ref 21–28)
HCO3 BLDV-SCNC: 25 MMOL/L (ref 21–28)
HCO3 SERPL-SCNC: 15 MMOL/L (ref 22–29)
HCO3 SERPL-SCNC: 21 MMOL/L (ref 22–29)
HCO3 SERPL-SCNC: 23 MMOL/L (ref 22–29)
HCOV PNL SPEC NAA+PROBE: NOT DETECTED
HCT VFR BLD AUTO: 47.7 % (ref 40–53)
HCT VFR BLD CALC: 45 % (ref 40–53)
HGB BLD-MCNC: 15.3 G/DL (ref 13.3–17.7)
HGB BLD-MCNC: 15.9 G/DL (ref 13.3–17.7)
HGB UR QL STRIP: NEGATIVE
HMPV RNA SPEC QL NAA+PROBE: NOT DETECTED
HOLD SPECIMEN: NORMAL
HPIV1 RNA SPEC QL NAA+PROBE: NOT DETECTED
HPIV2 RNA SPEC QL NAA+PROBE: NOT DETECTED
HPIV3 RNA SPEC QL NAA+PROBE: NOT DETECTED
HPIV4 RNA SPEC QL NAA+PROBE: NOT DETECTED
HYALINE CASTS: 9 /LPF
IMM GRANULOCYTES # BLD: 0.1 10E3/UL
IMM GRANULOCYTES NFR BLD: 1 %
INR PPP: 1.04 (ref 0.85–1.15)
INTERPRETATION ECG - MUSE: NORMAL
KETONES UR STRIP-MCNC: NEGATIVE MG/DL
LACTATE BLD-SCNC: 4.1 MMOL/L
LACTATE SERPL-SCNC: 1.8 MMOL/L (ref 0.7–2)
LACTATE SERPL-SCNC: 4.3 MMOL/L (ref 0.7–2)
LEUKOCYTE ESTERASE UR QL STRIP: NEGATIVE
LVEF ECHO: NORMAL
LYMPHOCYTES # BLD AUTO: 2.3 10E3/UL (ref 0.8–5.3)
LYMPHOCYTES NFR BLD AUTO: 29 %
M PNEUMO DNA SPEC QL NAA+PROBE: NOT DETECTED
MAGNESIUM SERPL-MCNC: 2.1 MG/DL (ref 1.7–2.3)
MCH RBC QN AUTO: 31.5 PG (ref 26.5–33)
MCHC RBC AUTO-ENTMCNC: 33.3 G/DL (ref 31.5–36.5)
MCV RBC AUTO: 95 FL (ref 78–100)
MONOCYTES # BLD AUTO: 0.8 10E3/UL (ref 0–1.3)
MONOCYTES NFR BLD AUTO: 10 %
MRSA DNA SPEC QL NAA+PROBE: NEGATIVE
MUCOUS THREADS #/AREA URNS LPF: PRESENT /LPF
NEUTROPHILS # BLD AUTO: 4.6 10E3/UL (ref 1.6–8.3)
NEUTROPHILS NFR BLD AUTO: 59 %
NITRATE UR QL: NEGATIVE
NRBC # BLD AUTO: 0 10E3/UL
NRBC BLD AUTO-RTO: 0 /100
NT-PROBNP SERPL-MCNC: 1695 PG/ML (ref 0–900)
O2/TOTAL GAS SETTING VFR VENT: 21 %
O2/TOTAL GAS SETTING VFR VENT: 30 %
O2/TOTAL GAS SETTING VFR VENT: 35 %
OSMOLALITY SERPL: 285 MMOL/KG (ref 280–301)
OXYHGB MFR BLDA: 92 % (ref 92–100)
OXYHGB MFR BLDV: 78 % (ref 70–75)
OXYHGB MFR BLDV: 91 % (ref 70–75)
P AXIS - MUSE: 37 DEGREES
P AXIS - MUSE: 52 DEGREES
P AXIS - MUSE: 71 DEGREES
P AXIS - MUSE: 74 DEGREES
P AXIS - MUSE: NORMAL DEGREES
PCO2 BLD: 46 MM HG (ref 35–45)
PCO2 BLDV: 38 MM HG (ref 40–50)
PCO2 BLDV: 64 MM HG (ref 40–50)
PCO2 BLDV: 64 MM HG (ref 40–50)
PCO2 BLDV: 66 MM HG (ref 40–50)
PH BLD: 7.28 [PH] (ref 7.35–7.45)
PH BLDV: 7.13 [PH] (ref 7.32–7.43)
PH BLDV: 7.13 [PH] (ref 7.32–7.43)
PH BLDV: 7.14 [PH] (ref 7.32–7.43)
PH BLDV: 7.43 [PH] (ref 7.32–7.43)
PH UR STRIP: 5.5 [PH] (ref 5–7)
PLATELET # BLD AUTO: 252 10E3/UL (ref 150–450)
PO2 BLD: 74 MM HG (ref 80–105)
PO2 BLDV: 56 MM HG (ref 25–47)
PO2 BLDV: 57 MM HG (ref 25–47)
PO2 BLDV: 57 MM HG (ref 25–47)
PO2 BLDV: 61 MM HG (ref 25–47)
POTASSIUM BLD-SCNC: 4.3 MMOL/L (ref 3.4–5.3)
POTASSIUM SERPL-SCNC: 3.7 MMOL/L (ref 3.4–5.3)
POTASSIUM SERPL-SCNC: 4.2 MMOL/L (ref 3.4–5.3)
POTASSIUM SERPL-SCNC: 4.2 MMOL/L (ref 3.4–5.3)
PR INTERVAL - MUSE: 180 MS
PR INTERVAL - MUSE: 184 MS
PR INTERVAL - MUSE: 190 MS
PR INTERVAL - MUSE: 208 MS
PR INTERVAL - MUSE: NORMAL MS
PROCALCITONIN SERPL IA-MCNC: 0.07 NG/ML
PROT SERPL-MCNC: 7.9 G/DL (ref 6.4–8.3)
QRS DURATION - MUSE: 126 MS
QRS DURATION - MUSE: 126 MS
QRS DURATION - MUSE: 132 MS
QRS DURATION - MUSE: 136 MS
QRS DURATION - MUSE: 136 MS
QT - MUSE: 310 MS
QT - MUSE: 318 MS
QT - MUSE: 354 MS
QT - MUSE: 408 MS
QT - MUSE: 454 MS
QTC - MUSE: 447 MS
QTC - MUSE: 504 MS
QTC - MUSE: 517 MS
QTC - MUSE: 520 MS
QTC - MUSE: 543 MS
R AXIS - MUSE: -2 DEGREES
R AXIS - MUSE: 1 DEGREES
R AXIS - MUSE: 1 DEGREES
R AXIS - MUSE: 16 DEGREES
R AXIS - MUSE: 3 DEGREES
RBC # BLD AUTO: 5.04 10E6/UL (ref 4.4–5.9)
RBC URINE: 1 /HPF
RSV RNA SPEC NAA+PROBE: NEGATIVE
RSV RNA SPEC QL NAA+PROBE: NOT DETECTED
RSV RNA SPEC QL NAA+PROBE: NOT DETECTED
RV+EV RNA SPEC QL NAA+PROBE: NOT DETECTED
SA TARGET DNA: NEGATIVE
SAO2 % BLDA: 93.7 % (ref 95–96)
SAO2 % BLDV: 77 % (ref 70–75)
SAO2 % BLDV: 78 % (ref 70–75)
SAO2 % BLDV: 79.8 % (ref 70–75)
SAO2 % BLDV: 92.5 % (ref 70–75)
SARS-COV-2 RNA RESP QL NAA+PROBE: NEGATIVE
SODIUM BLD-SCNC: 132 MMOL/L (ref 135–145)
SODIUM SERPL-SCNC: 132 MMOL/L (ref 135–145)
SODIUM SERPL-SCNC: 133 MMOL/L (ref 135–145)
SODIUM SERPL-SCNC: 137 MMOL/L (ref 135–145)
SP GR UR STRIP: 1.02 (ref 1–1.03)
SYSTOLIC BLOOD PRESSURE - MUSE: NORMAL MMHG
T AXIS - MUSE: 113 DEGREES
T AXIS - MUSE: 126 DEGREES
T AXIS - MUSE: 146 DEGREES
T AXIS - MUSE: 148 DEGREES
T AXIS - MUSE: 151 DEGREES
TROPONIN T SERPL HS-MCNC: 17 NG/L
TROPONIN T SERPL HS-MCNC: 196 NG/L
TROPONIN T SERPL HS-MCNC: 198 NG/L
TROPONIN T SERPL HS-MCNC: 61 NG/L
TSH SERPL DL<=0.005 MIU/L-ACNC: 1.87 UIU/ML (ref 0.3–4.2)
UROBILINOGEN UR STRIP-MCNC: NORMAL MG/DL
VENTRICULAR RATE- MUSE: 122 BPM
VENTRICULAR RATE- MUSE: 125 BPM
VENTRICULAR RATE- MUSE: 159 BPM
VENTRICULAR RATE- MUSE: 86 BPM
VENTRICULAR RATE- MUSE: 98 BPM
WBC # BLD AUTO: 7.9 10E3/UL (ref 4–11)
WBC URINE: <1 /HPF

## 2024-12-26 PROCEDURE — 93005 ELECTROCARDIOGRAM TRACING: CPT | Performed by: EMERGENCY MEDICINE

## 2024-12-26 PROCEDURE — 80048 BASIC METABOLIC PNL TOTAL CA: CPT | Performed by: EMERGENCY MEDICINE

## 2024-12-26 PROCEDURE — 250N000011 HC RX IP 250 OP 636: Performed by: EMERGENCY MEDICINE

## 2024-12-26 PROCEDURE — 83605 ASSAY OF LACTIC ACID: CPT

## 2024-12-26 PROCEDURE — 250N000009 HC RX 250

## 2024-12-26 PROCEDURE — 96374 THER/PROPH/DIAG INJ IV PUSH: CPT | Mod: 59 | Performed by: EMERGENCY MEDICINE

## 2024-12-26 PROCEDURE — 120N000002 HC R&B MED SURG/OB UMMC

## 2024-12-26 PROCEDURE — 87640 STAPH A DNA AMP PROBE: CPT | Performed by: INTERNAL MEDICINE

## 2024-12-26 PROCEDURE — 82962 GLUCOSE BLOOD TEST: CPT

## 2024-12-26 PROCEDURE — 84145 PROCALCITONIN (PCT): CPT

## 2024-12-26 PROCEDURE — 93308 TTE F-UP OR LMTD: CPT | Mod: 26 | Performed by: EMERGENCY MEDICINE

## 2024-12-26 PROCEDURE — 36415 COLL VENOUS BLD VENIPUNCTURE: CPT

## 2024-12-26 PROCEDURE — 87637 SARSCOV2&INF A&B&RSV AMP PRB: CPT | Performed by: EMERGENCY MEDICINE

## 2024-12-26 PROCEDURE — 250N000011 HC RX IP 250 OP 636

## 2024-12-26 PROCEDURE — 85379 FIBRIN DEGRADATION QUANT: CPT | Performed by: EMERGENCY MEDICINE

## 2024-12-26 PROCEDURE — 250N000013 HC RX MED GY IP 250 OP 250 PS 637

## 2024-12-26 PROCEDURE — 99233 SBSQ HOSP IP/OBS HIGH 50: CPT | Mod: 25 | Performed by: CASE MANAGER/CARE COORDINATOR

## 2024-12-26 PROCEDURE — 94640 AIRWAY INHALATION TREATMENT: CPT

## 2024-12-26 PROCEDURE — 93306 TTE W/DOPPLER COMPLETE: CPT | Mod: 26 | Performed by: STUDENT IN AN ORGANIZED HEALTH CARE EDUCATION/TRAINING PROGRAM

## 2024-12-26 PROCEDURE — 99291 CRITICAL CARE FIRST HOUR: CPT | Mod: 25 | Performed by: EMERGENCY MEDICINE

## 2024-12-26 PROCEDURE — 250N000009 HC RX 250: Performed by: EMERGENCY MEDICINE

## 2024-12-26 PROCEDURE — 82947 ASSAY GLUCOSE BLOOD QUANT: CPT

## 2024-12-26 PROCEDURE — 87486 CHLMYD PNEUM DNA AMP PROBE: CPT | Performed by: EMERGENCY MEDICINE

## 2024-12-26 PROCEDURE — 93005 ELECTROCARDIOGRAM TRACING: CPT

## 2024-12-26 PROCEDURE — 84484 ASSAY OF TROPONIN QUANT: CPT

## 2024-12-26 PROCEDURE — 83930 ASSAY OF BLOOD OSMOLALITY: CPT | Performed by: EMERGENCY MEDICINE

## 2024-12-26 PROCEDURE — 93308 TTE F-UP OR LMTD: CPT | Performed by: EMERGENCY MEDICINE

## 2024-12-26 PROCEDURE — 96375 TX/PRO/DX INJ NEW DRUG ADDON: CPT | Performed by: EMERGENCY MEDICINE

## 2024-12-26 PROCEDURE — 83735 ASSAY OF MAGNESIUM: CPT | Performed by: EMERGENCY MEDICINE

## 2024-12-26 PROCEDURE — 83036 HEMOGLOBIN GLYCOSYLATED A1C: CPT

## 2024-12-26 PROCEDURE — 82330 ASSAY OF CALCIUM: CPT

## 2024-12-26 PROCEDURE — 82077 ASSAY SPEC XCP UR&BREATH IA: CPT | Performed by: EMERGENCY MEDICINE

## 2024-12-26 PROCEDURE — 36415 COLL VENOUS BLD VENIPUNCTURE: CPT | Performed by: EMERGENCY MEDICINE

## 2024-12-26 PROCEDURE — 999N000208 ECHOCARDIOGRAM COMPLETE

## 2024-12-26 PROCEDURE — 93010 ELECTROCARDIOGRAM REPORT: CPT | Performed by: EMERGENCY MEDICINE

## 2024-12-26 PROCEDURE — 71275 CT ANGIOGRAPHY CHEST: CPT

## 2024-12-26 PROCEDURE — 87040 BLOOD CULTURE FOR BACTERIA: CPT | Performed by: EMERGENCY MEDICINE

## 2024-12-26 PROCEDURE — 87641 MR-STAPH DNA AMP PROBE: CPT | Performed by: INTERNAL MEDICINE

## 2024-12-26 PROCEDURE — 85730 THROMBOPLASTIN TIME PARTIAL: CPT

## 2024-12-26 PROCEDURE — 80051 ELECTROLYTE PANEL: CPT

## 2024-12-26 PROCEDURE — 82010 KETONE BODYS QUAN: CPT | Performed by: EMERGENCY MEDICINE

## 2024-12-26 PROCEDURE — 85004 AUTOMATED DIFF WBC COUNT: CPT

## 2024-12-26 PROCEDURE — 71045 X-RAY EXAM CHEST 1 VIEW: CPT

## 2024-12-26 PROCEDURE — 82435 ASSAY OF BLOOD CHLORIDE: CPT | Performed by: EMERGENCY MEDICINE

## 2024-12-26 PROCEDURE — 82435 ASSAY OF BLOOD CHLORIDE: CPT

## 2024-12-26 PROCEDURE — 96365 THER/PROPH/DIAG IV INF INIT: CPT | Mod: 59 | Performed by: EMERGENCY MEDICINE

## 2024-12-26 PROCEDURE — 99207 PR NO CHARGE LOS: CPT | Performed by: STUDENT IN AN ORGANIZED HEALTH CARE EDUCATION/TRAINING PROGRAM

## 2024-12-26 PROCEDURE — 82805 BLOOD GASES W/O2 SATURATION: CPT

## 2024-12-26 PROCEDURE — 99292 CRITICAL CARE ADDL 30 MIN: CPT | Mod: 25 | Performed by: EMERGENCY MEDICINE

## 2024-12-26 PROCEDURE — 84443 ASSAY THYROID STIM HORMONE: CPT | Performed by: EMERGENCY MEDICINE

## 2024-12-26 PROCEDURE — 82803 BLOOD GASES ANY COMBINATION: CPT

## 2024-12-26 PROCEDURE — 94640 AIRWAY INHALATION TREATMENT: CPT | Mod: 76

## 2024-12-26 PROCEDURE — 999N000157 HC STATISTIC RCP TIME EA 10 MIN

## 2024-12-26 PROCEDURE — 85041 AUTOMATED RBC COUNT: CPT

## 2024-12-26 PROCEDURE — 81003 URINALYSIS AUTO W/O SCOPE: CPT | Performed by: EMERGENCY MEDICINE

## 2024-12-26 PROCEDURE — 99285 EMERGENCY DEPT VISIT HI MDM: CPT | Mod: 25 | Performed by: EMERGENCY MEDICINE

## 2024-12-26 PROCEDURE — 83880 ASSAY OF NATRIURETIC PEPTIDE: CPT

## 2024-12-26 PROCEDURE — 86140 C-REACTIVE PROTEIN: CPT

## 2024-12-26 PROCEDURE — 99223 1ST HOSP IP/OBS HIGH 75: CPT

## 2024-12-26 PROCEDURE — 255N000002 HC RX 255 OP 636: Performed by: STUDENT IN AN ORGANIZED HEALTH CARE EDUCATION/TRAINING PROGRAM

## 2024-12-26 PROCEDURE — 85610 PROTHROMBIN TIME: CPT

## 2024-12-26 PROCEDURE — 80053 COMPREHEN METABOLIC PANEL: CPT

## 2024-12-26 PROCEDURE — 258N000003 HC RX IP 258 OP 636: Performed by: EMERGENCY MEDICINE

## 2024-12-26 PROCEDURE — 5A09357 ASSISTANCE WITH RESPIRATORY VENTILATION, LESS THAN 24 CONSECUTIVE HOURS, CONTINUOUS POSITIVE AIRWAY PRESSURE: ICD-10-PCS

## 2024-12-26 PROCEDURE — 272N000272 HC CONTINUOUS NEBULIZER MICRO PUMP

## 2024-12-26 PROCEDURE — 94660 CPAP INITIATION&MGMT: CPT

## 2024-12-26 PROCEDURE — 99207 PR NO BILLABLE SERVICE THIS VISIT: CPT | Performed by: STUDENT IN AN ORGANIZED HEALTH CARE EDUCATION/TRAINING PROGRAM

## 2024-12-26 PROCEDURE — 82805 BLOOD GASES W/O2 SATURATION: CPT | Performed by: EMERGENCY MEDICINE

## 2024-12-26 PROCEDURE — 250N000013 HC RX MED GY IP 250 OP 250 PS 637: Performed by: EMERGENCY MEDICINE

## 2024-12-26 RX ORDER — FUROSEMIDE 10 MG/ML
40 INJECTION INTRAMUSCULAR; INTRAVENOUS ONCE
Status: COMPLETED | OUTPATIENT
Start: 2024-12-26 | End: 2024-12-26

## 2024-12-26 RX ORDER — IPRATROPIUM BROMIDE AND ALBUTEROL SULFATE 2.5; .5 MG/3ML; MG/3ML
3 SOLUTION RESPIRATORY (INHALATION) ONCE
Status: COMPLETED | OUTPATIENT
Start: 2024-12-26 | End: 2024-12-26

## 2024-12-26 RX ORDER — OSELTAMIVIR PHOSPHATE 75 MG/1
75 CAPSULE ORAL ONCE
Status: COMPLETED | OUTPATIENT
Start: 2024-12-26 | End: 2024-12-26

## 2024-12-26 RX ORDER — ONDANSETRON 2 MG/ML
4 INJECTION INTRAMUSCULAR; INTRAVENOUS EVERY 6 HOURS PRN
Status: ACTIVE | OUTPATIENT
Start: 2024-12-26

## 2024-12-26 RX ORDER — OSELTAMIVIR PHOSPHATE 75 MG/1
75 CAPSULE ORAL 2 TIMES DAILY
Status: DISPENSED | OUTPATIENT
Start: 2024-12-26

## 2024-12-26 RX ORDER — THIAMINE HYDROCHLORIDE 100 MG/ML
200 INJECTION, SOLUTION INTRAMUSCULAR; INTRAVENOUS 3 TIMES DAILY
Status: DISPENSED | OUTPATIENT
Start: 2024-12-26 | End: 2024-12-28

## 2024-12-26 RX ORDER — IPRATROPIUM BROMIDE AND ALBUTEROL SULFATE 2.5; .5 MG/3ML; MG/3ML
SOLUTION RESPIRATORY (INHALATION)
Status: DISCONTINUED
Start: 2024-12-26 | End: 2024-12-26 | Stop reason: HOSPADM

## 2024-12-26 RX ORDER — NITROGLYCERIN 0.4 MG/1
0.4 TABLET SUBLINGUAL EVERY 5 MIN PRN
Status: DISCONTINUED | OUTPATIENT
Start: 2024-12-26 | End: 2024-12-26

## 2024-12-26 RX ORDER — DEXTROSE MONOHYDRATE 25 G/50ML
25-50 INJECTION, SOLUTION INTRAVENOUS
Status: ACTIVE | OUTPATIENT
Start: 2024-12-26

## 2024-12-26 RX ORDER — SENNOSIDES 8.6 MG
8.6 TABLET ORAL 2 TIMES DAILY PRN
Status: ACTIVE | OUTPATIENT
Start: 2024-12-26

## 2024-12-26 RX ORDER — NITROGLYCERIN 0.4 MG/1
0.4 TABLET SUBLINGUAL EVERY 5 MIN PRN
Status: DISPENSED | OUTPATIENT
Start: 2024-12-26

## 2024-12-26 RX ORDER — DIAZEPAM 10 MG/2ML
5-10 INJECTION, SOLUTION INTRAMUSCULAR; INTRAVENOUS EVERY 30 MIN PRN
Status: DISCONTINUED | OUTPATIENT
Start: 2024-12-26 | End: 2024-12-26

## 2024-12-26 RX ORDER — NICOTINE POLACRILEX 4 MG
15-30 LOZENGE BUCCAL
Status: ACTIVE | OUTPATIENT
Start: 2024-12-26

## 2024-12-26 RX ORDER — IOPAMIDOL 755 MG/ML
100 INJECTION, SOLUTION INTRAVASCULAR ONCE
Status: COMPLETED | OUTPATIENT
Start: 2024-12-26 | End: 2024-12-26

## 2024-12-26 RX ORDER — AZITHROMYCIN 500 MG/5ML
500 INJECTION, POWDER, LYOPHILIZED, FOR SOLUTION INTRAVENOUS EVERY 24 HOURS
Status: DISPENSED | OUTPATIENT
Start: 2024-12-27 | End: 2024-12-29

## 2024-12-26 RX ORDER — ACETAMINOPHEN 325 MG/1
975 TABLET ORAL EVERY 6 HOURS PRN
Status: ACTIVE | OUTPATIENT
Start: 2024-12-26

## 2024-12-26 RX ORDER — ENOXAPARIN SODIUM 100 MG/ML
40 INJECTION SUBCUTANEOUS EVERY 24 HOURS
Status: DISPENSED | OUTPATIENT
Start: 2024-12-26

## 2024-12-26 RX ORDER — SODIUM CHLORIDE 9 MG/ML
INJECTION, SOLUTION INTRAVENOUS
Status: DISCONTINUED
Start: 2024-12-26 | End: 2024-12-26 | Stop reason: HOSPADM

## 2024-12-26 RX ORDER — MAGNESIUM SULFATE HEPTAHYDRATE 40 MG/ML
2 INJECTION, SOLUTION INTRAVENOUS ONCE
Status: DISCONTINUED | OUTPATIENT
Start: 2024-12-26 | End: 2024-12-26

## 2024-12-26 RX ORDER — CEFAZOLIN SODIUM 1 G/50ML
1750 SOLUTION INTRAVENOUS ONCE
Status: COMPLETED | OUTPATIENT
Start: 2024-12-26 | End: 2024-12-26

## 2024-12-26 RX ORDER — METOPROLOL SUCCINATE 25 MG/1
25 TABLET, EXTENDED RELEASE ORAL DAILY
Status: DISPENSED | OUTPATIENT
Start: 2024-12-26

## 2024-12-26 RX ORDER — LORAZEPAM 2 MG/ML
1 INJECTION INTRAMUSCULAR ONCE
Status: COMPLETED | OUTPATIENT
Start: 2024-12-26 | End: 2024-12-26

## 2024-12-26 RX ORDER — IPRATROPIUM BROMIDE AND ALBUTEROL SULFATE 2.5; .5 MG/3ML; MG/3ML
3 SOLUTION RESPIRATORY (INHALATION) 3 TIMES DAILY
Status: DISPENSED | OUTPATIENT
Start: 2024-12-26

## 2024-12-26 RX ORDER — FOLIC ACID 5 MG/ML
1 INJECTION, SOLUTION INTRAMUSCULAR; INTRAVENOUS; SUBCUTANEOUS ONCE
Status: COMPLETED | OUTPATIENT
Start: 2024-12-26 | End: 2024-12-26

## 2024-12-26 RX ORDER — MAGNESIUM SULFATE HEPTAHYDRATE 500 MG/ML
2 INJECTION, SOLUTION INTRAMUSCULAR; INTRAVENOUS ONCE
Status: DISCONTINUED | OUTPATIENT
Start: 2024-12-26 | End: 2024-12-26

## 2024-12-26 RX ORDER — AZITHROMYCIN 250 MG/1
500 TABLET, FILM COATED ORAL ONCE
Status: COMPLETED | OUTPATIENT
Start: 2024-12-26 | End: 2024-12-26

## 2024-12-26 RX ORDER — DILTIAZEM HYDROCHLORIDE 5 MG/ML
0.25 INJECTION INTRAVENOUS ONCE
Status: COMPLETED | OUTPATIENT
Start: 2024-12-26 | End: 2024-12-26

## 2024-12-26 RX ORDER — ALBUTEROL SULFATE 0.83 MG/ML
2.5 SOLUTION RESPIRATORY (INHALATION) ONCE
Status: DISCONTINUED | OUTPATIENT
Start: 2024-12-26 | End: 2024-12-26

## 2024-12-26 RX ORDER — ACETAMINOPHEN 325 MG/1
975 TABLET ORAL ONCE
Status: COMPLETED | OUTPATIENT
Start: 2024-12-26 | End: 2024-12-26

## 2024-12-26 RX ORDER — DIAZEPAM 10 MG/1
10 TABLET ORAL EVERY 30 MIN PRN
Status: DISCONTINUED | OUTPATIENT
Start: 2024-12-26 | End: 2024-12-26

## 2024-12-26 RX ORDER — PIPERACILLIN SODIUM, TAZOBACTAM SODIUM 3; .375 G/15ML; G/15ML
3.38 INJECTION, POWDER, LYOPHILIZED, FOR SOLUTION INTRAVENOUS ONCE
Status: COMPLETED | OUTPATIENT
Start: 2024-12-26 | End: 2024-12-26

## 2024-12-26 RX ORDER — PIPERACILLIN SODIUM, TAZOBACTAM SODIUM 3; .375 G/15ML; G/15ML
3.38 INJECTION, POWDER, LYOPHILIZED, FOR SOLUTION INTRAVENOUS EVERY 6 HOURS
Status: DISPENSED | OUTPATIENT
Start: 2024-12-26

## 2024-12-26 RX ORDER — METHYLPREDNISOLONE SODIUM SUCCINATE 125 MG/2ML
125 INJECTION INTRAMUSCULAR; INTRAVENOUS ONCE
Status: COMPLETED | OUTPATIENT
Start: 2024-12-26 | End: 2024-12-26

## 2024-12-26 RX ORDER — POLYETHYLENE GLYCOL 3350 17 G/17G
17 POWDER, FOR SOLUTION ORAL DAILY PRN
Status: ACTIVE | OUTPATIENT
Start: 2024-12-26

## 2024-12-26 RX ADMIN — ENOXAPARIN SODIUM 40 MG: 40 INJECTION SUBCUTANEOUS at 08:01

## 2024-12-26 RX ADMIN — PIPERACILLIN AND TAZOBACTAM 3.38 G: 3; .375 INJECTION, POWDER, LYOPHILIZED, FOR SOLUTION INTRAVENOUS at 16:25

## 2024-12-26 RX ADMIN — IPRATROPIUM BROMIDE AND ALBUTEROL SULFATE 3 ML: .5; 3 SOLUTION RESPIRATORY (INHALATION) at 13:22

## 2024-12-26 RX ADMIN — IPRATROPIUM BROMIDE AND ALBUTEROL SULFATE 3 ML: .5; 3 SOLUTION RESPIRATORY (INHALATION) at 20:17

## 2024-12-26 RX ADMIN — AZITHROMYCIN DIHYDRATE 500 MG: 250 TABLET ORAL at 04:12

## 2024-12-26 RX ADMIN — THIAMINE HYDROCHLORIDE 200 MG: 100 INJECTION, SOLUTION INTRAMUSCULAR; INTRAVENOUS at 20:27

## 2024-12-26 RX ADMIN — NITROGLYCERIN 0.4 MG: 0.4 TABLET SUBLINGUAL at 02:23

## 2024-12-26 RX ADMIN — SODIUM CHLORIDE 250 ML: 9 INJECTION, SOLUTION INTRAVENOUS at 04:21

## 2024-12-26 RX ADMIN — FOLIC ACID 1 MG: 5 INJECTION, SOLUTION INTRAMUSCULAR; INTRAVENOUS; SUBCUTANEOUS at 03:57

## 2024-12-26 RX ADMIN — THIAMINE HYDROCHLORIDE 200 MG: 100 INJECTION, SOLUTION INTRAMUSCULAR; INTRAVENOUS at 08:01

## 2024-12-26 RX ADMIN — FUROSEMIDE 40 MG: 10 INJECTION, SOLUTION INTRAMUSCULAR; INTRAVENOUS at 02:33

## 2024-12-26 RX ADMIN — FUROSEMIDE 40 MG: 10 INJECTION, SOLUTION INTRAMUSCULAR; INTRAVENOUS at 02:52

## 2024-12-26 RX ADMIN — METOPROLOL SUCCINATE 25 MG: 25 TABLET, FILM COATED, EXTENDED RELEASE ORAL at 17:02

## 2024-12-26 RX ADMIN — SODIUM CHLORIDE 250 ML: 9 INJECTION, SOLUTION INTRAVENOUS at 03:48

## 2024-12-26 RX ADMIN — PIPERACILLIN AND TAZOBACTAM 3.38 G: 3; .375 INJECTION, POWDER, LYOPHILIZED, FOR SOLUTION INTRAVENOUS at 03:48

## 2024-12-26 RX ADMIN — IOPAMIDOL 64 ML: 755 INJECTION, SOLUTION INTRAVENOUS at 03:23

## 2024-12-26 RX ADMIN — LORAZEPAM 1 MG: 2 INJECTION INTRAMUSCULAR; INTRAVENOUS at 03:25

## 2024-12-26 RX ADMIN — DILTIAZEM HYDROCHLORIDE 20.2 MG: 5 INJECTION, SOLUTION INTRAVENOUS at 02:33

## 2024-12-26 RX ADMIN — SODIUM CHLORIDE 84 ML: 9 INJECTION, SOLUTION INTRAVENOUS at 03:09

## 2024-12-26 RX ADMIN — PIPERACILLIN AND TAZOBACTAM 3.38 G: 3; .375 INJECTION, POWDER, LYOPHILIZED, FOR SOLUTION INTRAVENOUS at 10:14

## 2024-12-26 RX ADMIN — VANCOMYCIN HYDROCHLORIDE 1750 MG: 1 INJECTION, POWDER, LYOPHILIZED, FOR SOLUTION INTRAVENOUS at 04:04

## 2024-12-26 RX ADMIN — PERFLUTREN 7 ML: 6.52 INJECTION, SUSPENSION INTRAVENOUS at 13:54

## 2024-12-26 RX ADMIN — OSELTAMIVIR PHOSPHATE 75 MG: 75 CAPSULE ORAL at 04:29

## 2024-12-26 RX ADMIN — THIAMINE HYDROCHLORIDE 200 MG: 100 INJECTION, SOLUTION INTRAMUSCULAR; INTRAVENOUS at 14:10

## 2024-12-26 RX ADMIN — ACETAMINOPHEN 975 MG: 325 TABLET, FILM COATED ORAL at 04:12

## 2024-12-26 RX ADMIN — METHYLPREDNISOLONE SODIUM SUCCINATE 125 MG: 125 INJECTION, POWDER, FOR SOLUTION INTRAMUSCULAR; INTRAVENOUS at 02:18

## 2024-12-26 RX ADMIN — IPRATROPIUM BROMIDE AND ALBUTEROL SULFATE 3 ML: .5; 3 SOLUTION RESPIRATORY (INHALATION) at 02:36

## 2024-12-26 RX ADMIN — IPRATROPIUM BROMIDE AND ALBUTEROL SULFATE 3 ML: .5; 3 SOLUTION RESPIRATORY (INHALATION) at 08:26

## 2024-12-26 RX ADMIN — OSELTAMIVIR PHOSPHATE 75 MG: 75 CAPSULE ORAL at 20:27

## 2024-12-26 RX ADMIN — PIPERACILLIN AND TAZOBACTAM 3.38 G: 3; .375 INJECTION, POWDER, LYOPHILIZED, FOR SOLUTION INTRAVENOUS at 22:04

## 2024-12-26 ASSESSMENT — ACTIVITIES OF DAILY LIVING (ADL)
ADLS_ACUITY_SCORE: 18
ADLS_ACUITY_SCORE: 41
ADLS_ACUITY_SCORE: 23
ADLS_ACUITY_SCORE: 20
ADLS_ACUITY_SCORE: 41
ADLS_ACUITY_SCORE: 41
ADLS_ACUITY_SCORE: 23
ADLS_ACUITY_SCORE: 20
ADLS_ACUITY_SCORE: 20
ADLS_ACUITY_SCORE: 18
ADLS_ACUITY_SCORE: 18
ADLS_ACUITY_SCORE: 23
ADLS_ACUITY_SCORE: 23
ADLS_ACUITY_SCORE: 41
ADLS_ACUITY_SCORE: 20
ADLS_ACUITY_SCORE: 35
ADLS_ACUITY_SCORE: 20
ADLS_ACUITY_SCORE: 41
ADLS_ACUITY_SCORE: 23
ADLS_ACUITY_SCORE: 35
ADLS_ACUITY_SCORE: 23
ADLS_ACUITY_SCORE: 20

## 2024-12-26 ASSESSMENT — LIFESTYLE VARIABLES
TREMOR: NO TREMOR
PAROXYSMAL SWEATS: 2
AGITATION: SOMEWHAT MORE THAN NORMAL ACTIVITY
VISUAL DISTURBANCES: NOT PRESENT
ANXIETY: MODERATELY ANXIOUS, OR GUARDED, SO ANXIETY IS INFERRED
HEADACHE, FULLNESS IN HEAD: NOT PRESENT
ORIENTATION AND CLOUDING OF SENSORIUM: ORIENTED AND CAN DO SERIAL ADDITIONS
TOTAL SCORE: 7
AUDITORY DISTURBANCES: NOT PRESENT
NAUSEA AND VOMITING: NO NAUSEA AND NO VOMITING

## 2024-12-26 NOTE — ED PROVIDER NOTES
ED Provider Note  [facility] EMERGENCY DEPARTMENT  Encounter Date: Dec 26, 2024    History of Present Illness:  Royal Adriano Duckworth is a 74 year old male who presents to the ED with Respiratory Arrest  Royal presents from the street with shortness of breath reportedly since yesterday. History limited by patient's respiratory failure. On chart review there is not a history of COPD, CAD, nor atrial fibrillation. Upon arrival he appears to be in atrial fibrillation with RVR    Patient started on albuterol-ipratropium nebs x3 with improvement in air movement. Methylpred and magnesium ordered but administered prior to transfer to the adult ED. Placed on bilevel pressure at 12/6.          Final diagnoses:   Acute hypoxemic respiratory failure (H)       Exam:  Pulse (!) 152   Temp 99.9  F (37.7  C) (Tympanic)   SpO2 95%   Physical Exam  Vitals and nursing note reviewed.   Constitutional:       General: He is in acute distress.      Appearance: He is ill-appearing and toxic-appearing.   HENT:      Head: Normocephalic.   Cardiovascular:      Rate and Rhythm: Tachycardia present.   Pulmonary:      Effort: Respiratory distress present.      Breath sounds: Wheezing present.      Comments: Poor air movement bilaterally with diffuse wheezing  Musculoskeletal:         General: Normal range of motion.      Cervical back: Normal range of motion.      Comments: Hands and feet cold to touch   Neurological:      Mental Status: He is alert.      Motor: No weakness.       Medical Decision Making  Royal Adriano Duckworth is a 74 year old M here with acute hypoxemic failure. The patient is critically ill. Stabilization with albuterol-ipratropium for poor air movement with wheezing bilaterally. Oxygen saturation improving following treatment. Patient placed on BiPAP 12/6 with work of breathing improving and oxygen saturation improving. EKG reveals atrial fibrillation with RVR    Critical care  35 minutes of critical care time were  needed for acute resuscitation of this patient with acute respiratory failure with critical care time including chart review, medication management, respiratory management, reassessment, and discussion with the adult emergency department physician                                                                                                                                                                                                                                                                                                                                                                                                                                                                                                                                                                                                                                                                                                                                                                                                                                                                                                                                                                                                                                                                                                                                                                                                                                                                                                                                                                                                                                                                                                                                                                                                                                                                                                                                                                                         Problems Addressed:  Acute hypoxemic respiratory failure (H): acute illness or injury that poses a threat to life or bodily functions    Amount and/or Complexity of Data Reviewed  Labs: ordered.        Medications, if ordered in the ED, are as follows  Medications   ipratropium - albuterol 0.5 mg/2.5 mg/3 mL (DUONEB) neb solution 3 mL (has no administration in time range)   methylPREDNISolone Na Suc (solu-MEDROL) injection 125 mg (has no administration in time range)   magnesium sulfate injection 2 g (has no administration in time range)       Labs, if obtained, are as follows  Results for orders placed or performed during the hospital encounter of 12/26/24 (from the past 24 hours)   INR   Result Value Ref Range    INR 1.04 0.85 - 1.15   CBC with platelets differential    Narrative    The following orders were created for panel order CBC with platelets differential.  Procedure                               Abnormality         Status                     ---------                               -----------         ------                     CBC with platelets and d...[[id number]]                      Final result                 Please view results for these tests on the individual orders.   [place] Draw    Narrative    The following orders were created for panel order [place] Draw.  Procedure                               Abnormality         Status                     ---------                               -----------         ------                     Extra Blue Top Tube[[id number]]                                                         Extra Green Top (Lithium...[[id number]]                                                 Extra Purple Top Tube[[id number]]                            In process                 Extra Purple Top Tube[[id number]]                                                         Please view results for these tests on the individual orders.   CBC with platelets and differential    Result Value Ref Range    WBC Count 7.9 4.0 - 11.0 10e3/uL    RBC Count 5.04 4.40 - 5.90 10e6/uL    Hemoglobin 15.9 13.3 - 17.7 g/dL    Hematocrit 47.7 40.0 - 53.0 %    MCV 95 78 - 100 fL    MCH 31.5 26.5 - 33.0 pg    MCHC 33.3 31.5 - 36.5 g/dL    RDW 12.7 10.0 - 15.0 %    Platelet Count 252 150 - 450 10e3/uL    % Neutrophils 59 %    % Lymphocytes 29 %    % Monocytes 10 %    % Eosinophils 0 %    % Basophils 1 %    % Immature Granulocytes 1 %    NRBCs per 100 WBC 0 <1 /100    Absolute Neutrophils 4.6 1.6 - 8.3 10e3/uL    Absolute Lymphocytes 2.3 0.8 - 5.3 10e3/uL    Absolute Monocytes 0.8 0.0 - 1.3 10e3/uL    Absolute Eosinophils 0.0 0.0 - 0.7 10e3/uL    Absolute Basophils 0.1 0.0 - 0.2 10e3/uL    Absolute Immature Granulocytes 0.1 <=0.4 10e3/uL    Absolute NRBCs 0.0 10e3/uL   iStat Gases (lactate) venous, POCT   Result Value Ref Range    Lactic Acid POCT 4.1 (HH) <=2.0 mmol/L    Bicarbonate Venous POCT 22 21 - 28 mmol/L    O2 Sat, Venous POCT 77 (H) 70 - 75 %    pCO2 Venous POCT 66 (H) 40 - 50 mm Hg    pH Venous POCT 7.13 (LL) 7.32 - 7.43    pO2 Venous POCT 56 (H) 25 - 47 mm Hg    Base Excess/Deficit (+/-) POCT -9.0 (L) -3.0 - 3.0 mmol/L       Medical History  Past Medical History:   Diagnosis Date    NO ACTIVE PROBLEMS        Surgical History  Past Surgical History:   Procedure Laterality Date    ZZC APPENDECTOMY         Allergies  Patient has no known allergies.    ___________________________________________________________________  I have reviewed the nursing notes. I have reviewed the findings, diagnosis, plan and need for follow up with the patient. I have discussed return precautions     Uriel Cahmbers MD on 12/26/2024 at 2:15 AM  [facility] PEDIATRIC EMERGENCY DEPARTMENT     Uriel Chambers MD  12/26/24 0803

## 2024-12-26 NOTE — PROGRESS NOTES
Brief medicine progress note      Called by [facility] ICU. Patient with new CHF on TTE. Cards involved, recommending CT angio and ongoing work up for etiology. Will transfer to [facility] med surg. Now off bipap and no pressors needed. Will await transfer in [facility] ICU until [facility] has bed. Patient added to shared triage, intercampus transfer list.     Larisa Louise MD

## 2024-12-26 NOTE — PHARMACY-ADMISSION MEDICATION HISTORY
Pharmacist Admission Medication History    Admission medication history is complete. The information provided in this note is only as accurate as the sources available at the time of the update.    Information Source(s): Patient and CareEverywhere/SureScripts via phone    Pertinent Information: No chronic home medications.     Changes made to PTA medication list:  Added: None  Deleted:   Sildenafil (script from 2013)  Changed: None    Allergies reviewed with patient and updates made in EHR: yes    Medication History Completed By: Manuel Roth RPH 12/26/2024 2:05 PM    No outpatient medications have been marked as taking for the 12/26/24 encounter (Hospital Encounter).

## 2024-12-26 NOTE — PHARMACY-VANCOMYCIN DOSING SERVICE
"Pharmacy Vancomycin Initial Note  Date of Service 2024  Patient's  1950  74 year old, male    Indication: Community Acquired Pneumonia    Current estimated CrCl = Estimated Creatinine Clearance: 72.2 mL/min (based on SCr of 1.08 mg/dL).    Creatinine for last 3 days  2024:  1:50 AM Creatinine 1.13 mg/dL;  4:46 AM Creatinine 1.08 mg/dL    Recent Vancomycin Level(s) for last 3 days  No results found for requested labs within last 3 days.      Vancomycin IV Administrations (past 72 hours)                     vancomycin (VANCOCIN) 1,750 mg in sodium chloride 0.9 % 567.5 mL intermittent infusion (mg) 1,750 mg Given 24 0404                    Nephrotoxins and other renal medications (From now, onward)      Start     Dose/Rate Route Frequency Ordered Stop    24 0400  vancomycin (VANCOCIN) 1,500 mg in 0.9% NaCl 265 mL intermittent infusion         1,500 mg  over 90 Minutes Intravenous EVERY 24 HOURS 24 0655      24 1000  piperacillin-tazobactam (ZOSYN) 3.375 g vial to attach to  mL bag        Note to Pharmacy: For [facility], [facility] and [facility]: For Zosyn-naive patients, use the \"Zosyn initial dose + extended infusion\" order panel.    3.375 g  over 30 Minutes Intravenous EVERY 6 HOURS 24 0610              Contrast Orders - past 72 hours (72h ago, onward)      Start     Dose/Rate Route Frequency Stop    24 0255  iopamidol (ISOVUE-370) solution 100 mL         100 mL Intravenous ONCE 24 0323            [employer] Prediction of Planned Initial Vancomycin Regimen  Loading dose: 1750 mg at 0400 2024.  Regimen: 1500 mg IV every 24 hours.  Start time: 04:00 on 2024  Exposure target: AUC24 (range)400-600 mg/L.hr   AUC24,ss: 493 mg/L.hr  Probability of AUC24 > 400: 73 %  Ctrough,ss: 14.2 mg/L  Probability of Ctrough,ss > 20: 22 %  Probability of nephrotoxicity (Lodise SWETA ): 9 %          Plan:  Start vancomycin  1500 mg IV q24h.   Vancomycin monitoring " method: AUC  Vancomycin therapeutic monitoring goal: 400-600 mg*h/L  Pharmacy will check vancomycin levels as appropriate in 1-3 Days.    Serum creatinine levels will be ordered a minimum of twice weekly.      Chris Castillo RPH

## 2024-12-26 NOTE — ED TRIAGE NOTES
Pt presents with dyspnea and increased WOB. Per pt he has been having SOB since yesterday.

## 2024-12-26 NOTE — ED NOTES
Pt immediately seen by MD Chambers. Pt was given 3 duo nebs and RT called. Pt started on BiPAP. PIV placed NS bolus started.Transferred to Adult ED RM 1.

## 2024-12-26 NOTE — PROGRESS NOTES
[facility] [place] PROGRESS NOTE  12/26/2024      Date of Service (when I saw the patient): 12/26/2024    ASSESSMENT: Royal Adriano Duckworth is a 74 year old male who does not doctor frequently and has no known past medical history, who was admitted to ICU on 12/26/2024 for shortness of breath and acute hypoxic/hypercapnic respiratory failure initially requiring BiPAP 2/2 H1N1 virus/CAP, also noted to have elevated troponin concerning for ACS vs demand ischemia .     CHANGES and MAJOR THINGS TODAY:   - Admitted overnight with acute hypoxic/hypercapnic respiratory failure thought 2/2 viral pneumonia +/- superimposed bacterial CAP, this morning with rising troponin 16 --> 61 --> 196 with LBBB on EKG (no prior EKGs to compare)  - STAT TTE ordered  - Serial Troponin, EKGs Q4H  - Cardiology consulted, appreciate recs  - Keep NPO for now while awaiting TTE, ?coronary angiography if RWMA  - Off BiPAP, weaned to 2L NC  - Add Azithromycin x2 additional doses for atypical coverage  - Continue Zosyn today, plan to de-escalate in next 24-hours  - MRSA nares in process, will discontinue Vanco if negative    PLAN:    Neuro:  # Pain and sedation  - Tylenol PRN    Pulmonary:  # Acute hypoxic, hypercapnic respiratory failure  # Influenza A, H1N1  # Community-acquired Pneumonia  Reported SOB x1 day, prompting further eval in ED and found to have O2 sats 40s. Initial VBG pH 7.14/CO2 of 64. Received multiple duonebs, started on BiPAP, broad spectrum antibiotics and Tamiflu initiated. CT PE negative for acute PE, diffuse GGO to bilateral upper lobes, scattered patchy pulmonary opacities, likely infectious. Admitted to ICU on BiPAP.   - Weaned to 2L NC this AM  - SpO2 goal >90%  - Encourage cough & deep breathing  - Duonebs TID  - Would likely benefit from PFTs outpatient as CT noted upper lobe emphysematous changes    Cardiovascular:  # Sinus tachycardia  # Left bundle branch block  # Elevated troponin   No known prior cardiac history. Initial  troponin 16-->61-->196. Consider demand ischemia 2/2 acute hypoxic respiratory failure vs ACS given unknown cardiac hx. BNP 1695, received 80 mg Lasix in ED for acute hypoxia and pulmonary edema on imaging.    - STAT TTE ordered  - Serial Troponin, EKGs Q4H  - Cardiology consulted, appreciate recs  - Keep NPO for now while awaiting TTE, ?coronary angiography if RWMA identified on echo in setting of elevated troponin, LBBB    GI/Nutrition:  - NPO for now, regular diet when appropriate  - Zofran PRN  - Miralax, Senna PRN     Renal/Fluids/Electrolytes:  # Hyponatremia  Unknown baseline creatinine, presented 1.13. Presented with Na 132-->133.     - Daily BMP, Mg, Phos  - Daily weight  - I&O monitoring  - ICU electrolyte replacement protocol    - Repeat BMP this afternoon    Endocrine:  # Hyperglycemia  A1c 5.5. Hyperglycemia likely stress-induced  - Not currently requiring sliding scale insulin  - Hypoglycemia protocol     ID:  # Influenza A, H1N1  # Community-acquired pneumonia  Presented with acute hypoxic/hypercapnic respiratory failure. CT chest with diffuse GGO and scattered pulmonary opacities c/f CAP. Respiratory virus panel +Influenza H1N1. UA unremarkable. Blood cultures in process.   - Continue Zosyn, Vanco  - De-escalate Zosyn in next 24 hours  - Discontinue Vanco if MRSA nares negative  - Add Azithromycin, plan for 3 doses for atypical coverage  - Continue Tamiflu  - Follow cultures  - No cough, unable to send sputum at this time        Hematology:    - Daily CBC  - SCDs  - Lovenox for DVT ppx     Musculoskeletal:  - PT / OT     General Cares/Prophylaxis:    DVT Prophylaxis: Enoxaparin (Lovenox) SQ  GI Prophylaxis: Not indicated  Restraints: NA  Family Communication: Spouse updated  Code Status: Full    Lines/tubes/drains:  - PIVs    Disposition:  - Transfer out of ICU    Patient seen and findings/plan discussed with medical ICU staff, Dr. Land.    PANCHO Baker CNP    Clinically Significant Risk  "Factors Present on Admission         # Hyponatremia: Lowest Na = 132 mmol/L in last 2 days, will monitor as appropriate  # Hypochloremia: Lowest Cl = 95 mmol/L in last 2 days, will monitor as appropriate  # Hypocalcemia: Lowest Ca = 8 mg/dL in last 2 days, will monitor and replace as appropriate    # Anion Gap Metabolic Acidosis: Highest Anion Gap = 22 mmol/L in last 2 days, will monitor and treat as appropriate          # Acute Hypercapnic Respiratory Failure: based on venous blood gas results.  Continue supplemental oxygen and ventilatory support as indicated.        # Overweight: Estimated body mass index is 25.77 kg/m  as calculated from the following:    Height as of this encounter: 1.803 m (5' 11\").    Weight as of this encounter: 83.8 kg (184 lb 11.9 oz).                  ====================================  INTERVAL HISTORY:   Admitted overnight for acute respiratory failure. This morning transitioned from BiPAP to 2L nasal cannula. Denies acute concerns, no shortness of breath, cough, chest pain, dizziness/lightheadedness, nausea, abdominal pain. States he felt short of breath most of the day yesterday that limited activity, with significant worsening last night prompting ED evaluation, feels improved this AM.     OBJECTIVE:   1. VITAL SIGNS:   Temp:  [98.1  F (36.7  C)-100.5  F (38.1  C)] 98.1  F (36.7  C)  Pulse:  [] 96  Resp:  [32-39] 37  BP: ()/() 97/62  SpO2:  [46 %-97 %] 96 %  Resp: (!) 37  2. INTAKE/ OUTPUT:   I/O last 3 completed shifts:  In: -   Out: 200 [Urine:200]    3. PHYSICAL EXAMINATION:  General: NAD  HEENT: NC, AT, PERRL, mucous membranes dry, intact.   Neuro: A&Ox3, equal strength, moving all extremities  Pulm/Resp: Diminished breath sounds bilaterally without rhonchi, crackles or wheeze, breathing non-labored on nasal cannula  CV: RRR, S1S2, no murmur, rub, or gallop  Abdomen: Soft, non-distended, non-tender, active bowel sounds  Ext: No edema, pulses 2+ radial, " pedal  Incisions/Skin: Warm, dry, well-perfused. No rashes or lesions.     4. LABS:   Arterial Blood Gases   Recent Labs   Lab 12/26/24  0228   PH 7.28*   PCO2 46*   PO2 74*   HCO3 21     Complete Blood Count   Recent Labs   Lab 12/26/24  0203 12/26/24  0150   WBC  --  7.9   HGB 15.3 15.9   PLT  --  252     Basic Metabolic Panel  Recent Labs   Lab 12/26/24  0821 12/26/24  0559 12/26/24  0446 12/26/24  0213 12/26/24  0203 12/26/24  0150   NA  --   --  133*  --  132* 132*   POTASSIUM  --   --  4.2  --  4.3 4.2   CHLORIDE  --   --  99  --   --  95*   CO2  --   --  21*  --   --  15*   BUN  --   --  12.7  --   --  11.1   CR  --   --  1.08  --   --  1.13   * 175* 206* 281* 283* 285*     Liver Function Tests  Recent Labs   Lab 12/26/24  0150   AST 28   ALT 24   ALKPHOS 105   BILITOTAL 0.3   ALBUMIN 4.1   INR 1.04     Coagulation Profile  Recent Labs   Lab 12/26/24  0150   INR 1.04   PTT 30       5. RADIOLOGY:   Recent Results (from the past 24 hours)   XR Chest Port 1 View    Narrative    EXAM: XR CHEST PORT 1 VIEW  LOCATION: [facility]  DATE: 12/26/2024    INDICATION: Dyspnea with hypoxia.  COMPARISON: None.      Impression    IMPRESSION: Pulmonary vascularity at the upper limits of normal. Normal heart size. Interstitial opacities in the subpleural aspect of the left lower lobe concerning for edema. Diffuse interstitial and nodular alveolar infiltrates bilaterally which can   be seen with bilateral pneumonia. Aortic calcification.   CT Chest Pulmonary Embolism w Contrast    Narrative    EXAM: CT CHEST PULMONARY EMBOLISM W CONTRAST  LOCATION: [facility]  DATE: 12/26/2024    INDICATION: Acute resp fail  COMPARISON: Chest x-ray on 12/26/2024  TECHNIQUE: CT chest pulmonary angiogram during arterial phase injection of IV contrast. Multiplanar reformats and MIP reconstructions were performed. Dose reduction techniques were used.    CONTRAST: 64mL Isovue 370    FINDINGS:  ANGIOGRAM CHEST: No evidence of pulmonary embolism. No evidence of thoracic aortic aneurysm or dissection. Extensive atherosclerotic vascular calcification of the thoracic aorta. There is no opacification of the proximal aspect of the left subclavian   artery which likely reconstituting distally from collaterals. No evidence for right heart strain.    LUNGS AND PLEURA: Trace right pleural effusion. No significant left pleural effusion. No significant pneumothorax. Upper lobes predominant emphysematous changes. Mild upper lobes predominant interlobular septal thickening and diffuse groundglass   pulmonary opacities, can be seen with pulmonary edema or infection. Scattered patchy nodular pulmonary opacities most prominent in the right upper lobe, likely infectious. Few scattered pulmonary nodules including 5 mm right upper lobe subpleural nodule   (series 6 image 104).    MEDIASTINUM/AXILLAE: No cardiomegaly or significant pericardial effusion. Multiple prominent mediastinal and hilar lymph nodes, indeterminate, could be reactive.    CORONARY ARTERY CALCIFICATION: Moderate.    UPPER ABDOMEN: Limited evaluation of the upper abdomen due to lack of coverage.    MUSCULOSKELETAL: Multilevel degenerative changes of the spine. No suspicious osseous lesion.      Impression    IMPRESSION:  1.  No evidence of pulmonary embolism.  2.  Bilateral upper lobes predominant interlobular septal thickening with diffuse groundglass pulmonary opacities, can be seen with pulmonary edema or infection. Multiple small patchy nodular groundglass pulmonary opacities, could be infectious.  3.  Trace right pleural effusion.  4.  There is no opacification of the proximal aspect of the left subclavian artery, likely occluded. It appears mildly opacified distally likely reconstituting from collaterals.  5.  A few scattered pulmonary nodules including 5 mm right upper lobe subpleural nodule, as per Fleischner  Society criteria, for low risk patient, no routine follow-up is recommended and for high-risk patient, optional chest CT in 12 months can be   considered.

## 2024-12-26 NOTE — PROGRESS NOTES
ICU Update    Spoke with cardiology regarding echo findings- LVEF 35-40% and base/mid anteroseptal hypokinesis. Recommending Coronary CTA, start Metoprolol succinate 25 mg daily with goal HR closer to 60 bpm prior to study. Will need to transfer to [place] for cardiac imaging (not available on [facility] [place]).      Jennifer Roe, CNP

## 2024-12-26 NOTE — PROGRESS NOTES
Major Shift Events:  Arrived to ICU at 0600. A&Ox4, denies pain. Denies chest pain and SOB. Normotensive, sinus rhythm. BIPAP, 14/7 35%. Continent, using urinal. Skin intact. 3 PIVs    Plan: Continue POC. Downgrade when able.    For vital signs and complete assessments, please see documentation flowsheets.

## 2024-12-26 NOTE — H&P
MEDICAL ICU H&P  12/26/2024  Date of Hospital Admission: 12/26/24   Date of ICU Admission: 12/26/24   Reason for Critical Care Admission: Hypoxic respiratory failure secondary to Influenza A  Date of Service (when I saw the patient): 12/26/2024    ASSESSMENT: Royal Adriano Duckworth is a 74 year old male who does not frequently doctor and does not have any known past medical history. The patient reports that he had been having shortness of breath for about the last day. He then decided to present to the ED the early morning of 12/26 via private vehicle. On arrival, the patient was found to have O2 saturations into the 40s. His pH was 7.14 with a CO2 of 64. He was treated with multiple duonebs and BiPAP with improvement in pH to 7.28. The patient was admitted to the ICU for ongoing management of hypoxic and hypercapnic respiratory failure.     CHANGES and MAJOR THINGS TODAY:   - Admit to ICU  - Continue BiPAP  - Tamiflu  - Zosyn and vancomycin    PLAN:    Neuro:  # Pain   - Tylenol PRN    Pulmonary:  # Hypoxic and hypercapnic respiratory failure  - Continue BiPAP 14/7  - Repeat VBG on arrival  - Duonebs TID    Cardiovascular:  # Sinus tachycardia  # Troponinemia  Troponin 17 on arrival, 61 on recheck. ED provider denied the patient having chest pain or other symptoms consistent with ACS.   - Repeat troponin on arrival  - EKG showed sinus tachycardia with left bundle branch block    GI/Nutrition:  No acute concerns  - NPO while on BiPAP    Renal/Fluids/Electrolytes:  # Anion gap - Resolved  # Lactic acidosis - Resolved  # Hyponatremia, mild  - I/Os  - No soto needed    Endocrine:  # Hyperglycemia  Patient does not have a history of DM2 to his knowledge  - Maintain blood glucose <180    ID:  # Influenza A  # Community acquired pneumonia  - CXR concerning for pneumonia  - Continue Tamiflu  - Continue zosyn and vancomycin, low threshold to discontinue  - US clear     Hematology:    No acute concerns  "    Musculoskeletal/Skin:  No acute concerns    General Cares/Prophylaxis:    DVT Prophylaxis: Enoxaparin (Lovenox) SQ  GI Prophylaxis: Not indicated  Restraints: None  Family Communication: Deferred by patient  Code Status: Full code    Lines/tubes/drains:  - PIVs    Disposition:  - [facility] ICU, though, patient is appropriate for downgrade    Tone Mcdonnell PA-C      Clinically Significant Risk Factors Present on Admission         # Hyponatremia: Lowest Na = 132 mmol/L in last 2 days, will monitor as appropriate  # Hypochloremia: Lowest Cl = 95 mmol/L in last 2 days, will monitor as appropriate  # Hypocalcemia: Lowest Ca = 8 mg/dL in last 2 days, will monitor and replace as appropriate    # Anion Gap Metabolic Acidosis: Highest Anion Gap = 22 mmol/L in last 2 days, will monitor and treat as appropriate          # Acute Hypercapnic Respiratory Failure: based on venous blood gas results.  Continue supplemental oxygen and ventilatory support as indicated.        # Overweight: Estimated body mass index is 25.77 kg/m  as calculated from the following:    Height as of this encounter: 1.803 m (5' 11\").    Weight as of this encounter: 83.8 kg (184 lb 11.9 oz).             -----------------------------------------------------------------------    HISTORY PRESENTING ILLNESS: Royal Adriano Duckworth is a 74 year old male who does not frequently doctor and does not have any known past medical history. The patient reports that he had been having shortness of breath for about the last day. He then decided to present to the ED the early morning of 12/26 via private vehicle. On arrival, the patient was found to have O2 saturations into the 40s. His pH was 7.14 with a CO2 of 64. He was treated with multiple duonebs and BiPAP with improvement in pH to 7.28. The patient was admitted to the ICU for ongoing management of hypoxic and hypercapnic respiratory failure.     REVIEW OF SYSTEMS:  ROS: 10 point ROS neg other than the " symptoms noted above in the HPI.     PAST MEDICAL HISTORY:   Past Medical History:   Diagnosis Date    NO ACTIVE PROBLEMS      SURGICAL HISTORY:  Past Surgical History:   Procedure Laterality Date    ZZC APPENDECTOMY       SOCIAL HISTORY:  Social History     Socioeconomic History    Marital status:      Spouse name: None    Number of children: None    Years of education: None    Highest education level: None   Occupational History    Occupation: bar tender     Employer: [employer]   Tobacco Use    Smoking status: Every Day     Current packs/day: 1.00     Types: Cigarettes    Smokeless tobacco: Never    Tobacco comments:     less than a pack a day    Vaping Use    Vaping status: Never Used   Substance and Sexual Activity    Alcohol use: Yes     Comment: a couple of beers 5days per wk    Drug use: No    Sexual activity: Yes     Partners: Female   Other Topics Concern    Parent/sibling w/ CABG, MI or angioplasty before 65F 55M? No   Social History Narrative    Balanced Diet - Yes    Osteoporosis Preventative measures-  Dairy servings per day: 2-3    Regular Exercise -  Yes Describe walking    Dental Exam up - YES - Date: 7/2006    Eye Exam - YES - Date: about a yr ago    Self Testicular Exam -  No    Do you have any concerns about STD's -  No    Abuse: Current or Past (Physical, Sexual or Emotional)- No    Do you feel safe in your environment - Yes    Guns stored in the home - No    Sunscreen used - sometimes    Seatbelts used - Yes    Lipids - about 10yrs ago    Glucose -  NO    Colon Cancer Screening - No    Hemoccults - NO    PSA - about 10yrs ago    Digital Rectal Exam - about 10yrs ago    Immunizations reviewed and up to date - Yes, 2004    ULICES Gonzalez MA         Social Drivers of Health     Interpersonal Safety: Low Risk  (5/31/2024)    Interpersonal Safety     Do you feel physically and emotionally safe where you currently live?: Yes     Within the past 12 months, have you been hit, slapped, kicked or  otherwise physically hurt by someone?: No     Within the past 12 months, have you been humiliated or emotionally abused in other ways by your partner or ex-partner?: No     FAMILY HISTORY:   Family History   Problem Relation Age of Onset    C.A.D. Mother         triple bypass 10yrs ago    Diabetes Mother     Eye Disorder Mother         poss    Respiratory Father         poss lung CA    Diabetes Brother     Melanoma No family hx of     Skin Cancer No family hx of      ALLERGIES:   No Known Allergies  MEDICATIONS:  Current Facility-Administered Medications   Medication Dose Route Frequency Provider Last Rate Last Admin    glucose gel 15-30 g  15-30 g Oral Q15 Min PRN Tone Mcdonnell PA-C        Or    dextrose 50 % injection 25-50 mL  25-50 mL Intravenous Q15 Min PRN Tone Mcdonnell PA-C        Or    glucagon injection 1 mg  1 mg Subcutaneous Q15 Min PRN Tone Mcdonnell PA-C        enoxaparin ANTICOAGULANT (LOVENOX) injection 40 mg  40 mg Subcutaneous Q24H Tone Mcdonnell PA-C        nitroGLYcerin (NITROSTAT) sublingual tablet 0.4 mg  0.4 mg Sublingual Q5 Min PRN Noemy Jeff MD   0.4 mg at 12/26/24 0223    oseltamivir (TAMIFLU) capsule 75 mg  75 mg Oral BID Tone Mcdonnell PA-C        piperacillin-tazobactam (ZOSYN) 3.375 g vial to attach to  mL bag  3.375 g Intravenous Q6H Tone Mcdonnell PA-C        thiamine (B-1) injection 200 mg  200 mg Intravenous TID Noemy Jeff MD        vancomycin place garcia - receiving intermittent dosing  1 each Intravenous See Admin Instructions Noeym Jeff MD           PHYSICAL EXAMINATION:  Temp:  [98.9  F (37.2  C)-100.5  F (38.1  C)] 99.1  F (37.3  C)  Pulse:  [] 96  Resp:  [32-39] 37  BP: ()/() 97/62  SpO2:  [46 %-97 %] 96 %  PHYSICAL EXAM  General: Lying supine in bed, in no acute distress, on BiPAP  Skin:  Pale, warm and dry.   Neuro: CNII-XII grossly intact  ENT: PERRL, EOMI, no nystagmus, anicteric  Heart:  Regular rate and rhythm, no murmurs, rubs, or gallops appreciated  Lungs: Expiratory wheeze in right lung, left lung clear, unlabored breathing, on BiPAP  Abdomen: Soft, nondistended, nontender, BS x4  Extremities: Moves to command, no edema, capillary refill <3 sec BL upper and lower extremities.   Mental:  Alert and oriented to person, place, and time.      LABS: Reviewed.   Arterial Blood Gases   Recent Labs   Lab 12/26/24  0228   PH 7.28*   PCO2 46*   PO2 74*   HCO3 21     Complete Blood Count   Recent Labs   Lab 12/26/24  0203 12/26/24  0150   WBC  --  7.9   HGB 15.3 15.9   PLT  --  252     Basic Metabolic Panel  Recent Labs   Lab 12/26/24  0559 12/26/24  0446 12/26/24  0213 12/26/24  0203 12/26/24  0150   NA  --  133*  --  132* 132*   POTASSIUM  --  4.2  --  4.3 4.2   CHLORIDE  --  99  --   --  95*   CO2  --  21*  --   --  15*   BUN  --  12.7  --   --  11.1   CR  --  1.08  --   --  1.13   * 206* 281* 283* 285*     Liver Function Tests  Recent Labs   Lab 12/26/24  0150   AST 28   ALT 24   ALKPHOS 105   BILITOTAL 0.3   ALBUMIN 4.1   INR 1.04     Coagulation Profile  Recent Labs   Lab 12/26/24  0150   INR 1.04   PTT 30       IMAGING:  Recent Results (from the past 24 hours)   XR Chest Port 1 View    Narrative    EXAM: XR CHEST PORT 1 VIEW  LOCATION: [facility]  DATE: 12/26/2024    INDICATION: Dyspnea with hypoxia.  COMPARISON: None.      Impression    IMPRESSION: Pulmonary vascularity at the upper limits of normal. Normal heart size. Interstitial opacities in the subpleural aspect of the left lower lobe concerning for edema. Diffuse interstitial and nodular alveolar infiltrates bilaterally which can   be seen with bilateral pneumonia. Aortic calcification.   CT Chest Pulmonary Embolism w Contrast    Narrative    EXAM: CT CHEST PULMONARY EMBOLISM W CONTRAST  LOCATION: [facility]  DATE: 12/26/2024    INDICATION: Acute  resp fail  COMPARISON: Chest x-ray on 12/26/2024  TECHNIQUE: CT chest pulmonary angiogram during arterial phase injection of IV contrast. Multiplanar reformats and MIP reconstructions were performed. Dose reduction techniques were used.   CONTRAST: 64mL Isovue 370    FINDINGS:  ANGIOGRAM CHEST: No evidence of pulmonary embolism. No evidence of thoracic aortic aneurysm or dissection. Extensive atherosclerotic vascular calcification of the thoracic aorta. There is no opacification of the proximal aspect of the left subclavian   artery which likely reconstituting distally from collaterals. No evidence for right heart strain.    LUNGS AND PLEURA: Trace right pleural effusion. No significant left pleural effusion. No significant pneumothorax. Upper lobes predominant emphysematous changes. Mild upper lobes predominant interlobular septal thickening and diffuse groundglass   pulmonary opacities, can be seen with pulmonary edema or infection. Scattered patchy nodular pulmonary opacities most prominent in the right upper lobe, likely infectious. Few scattered pulmonary nodules including 5 mm right upper lobe subpleural nodule   (series 6 image 104).    MEDIASTINUM/AXILLAE: No cardiomegaly or significant pericardial effusion. Multiple prominent mediastinal and hilar lymph nodes, indeterminate, could be reactive.    CORONARY ARTERY CALCIFICATION: Moderate.    UPPER ABDOMEN: Limited evaluation of the upper abdomen due to lack of coverage.    MUSCULOSKELETAL: Multilevel degenerative changes of the spine. No suspicious osseous lesion.      Impression    IMPRESSION:  1.  No evidence of pulmonary embolism.  2.  Bilateral upper lobes predominant interlobular septal thickening with diffuse groundglass pulmonary opacities, can be seen with pulmonary edema or infection. Multiple small patchy nodular groundglass pulmonary opacities, could be infectious.  3.  Trace right pleural effusion.  4.  There is no opacification of the proximal  aspect of the left subclavian artery, likely occluded. It appears mildly opacified distally likely reconstituting from collaterals.  5.  A few scattered pulmonary nodules including 5 mm right upper lobe subpleural nodule, as per Fleischner Society criteria, for low risk patient, no routine follow-up is recommended and for high-risk patient, optional chest CT in 12 months can be   considered.

## 2024-12-26 NOTE — CONSULTS
Cardiology Inpatient Consultation  December 26, 2024    Reason for Consult:  A cardiology consult was requested by [facility] medicine service to provide clinical guidance regarding elevated troponin and new LBBB.    Assessment and Recommendation:  Royal Adriano Duckworth is a 74 year old male with no known PMH who presented to [facility] with shortness of breath found to be in hypoxic and hypercapnic respiratory failure.    # Elevated troponin  # SVT on admission 12/26/2024      Troponin rise in the past 6 hours 17 ->61 ->196 ->198. Likely in the setting of demand ischemia given patient's acute hypoxic respiratory failure with positive influenza A test, however with minimal PMH cannot rule out ischemic coronary event. Of note, CT PE showed moderate coronary artery calcifications.   - agree with primary team's echocardiogram order to assess for WMAs, will follow results. If normal echo, no further inpatient cardiac workup needed. If WMAs evident, we will consider CCTA vs angiography.  - recommend OP cardiology follow up for nuclear stress test (Lexiscan)  - discontinue troponin labs with recent plateau     # LBBB  Noted on EKG in ED today, no prior EKGs available for comparison.  - as above, recommend OP nuclear stress test (Lexiscan) as preferred stress test method for pts with LBBB    Patient seen and discussed with Dr. Willis, who agrees with above plan.    Thank you for consulting the cardiovascular services at the [facility]. We will sign off from consult at this time. Please do not hesitate to call us with any questions.     PANCHO ROWELL [facility] Cardiology Consult Team  [phone]    HPI:   Royal Adriano Duckowrth is a 74 year old male with no known PMH who presented to [facility] with shortness of breath found to be in hypoxic and hypercapnic respiratory failure.    He presented to [facility] in the morning o 12/26/24 with shortness of breath over the past day. On  arrival, pt found to have O2 sats in the 40s. Initial ABG showed pH 7.14 with CO2 64. He was treated with multiple duonebs and BiPAP with improvement in pH to 7.28, and was admitted to ICU for ongoing management. No complaints of chest pain during his admission.    Review of Systems:    Complete review of systems was performed and negative except per HPI.    PMH:  Past Medical History:   Diagnosis Date    NO ACTIVE PROBLEMS      Active Problems:  Patient Active Problem List    Diagnosis Date Noted    Acute hypoxemic respiratory failure (H) 12/26/2024     Priority: Medium    CARDIOVASCULAR SCREENING; LDL GOAL LESS THAN 160 04/02/2013     Priority: Medium    Impotence of organic origin 08/21/2006     Priority: Medium     Social History:  Social History     Tobacco Use    Smoking status: Every Day     Current packs/day: 1.00     Types: Cigarettes    Smokeless tobacco: Never    Tobacco comments:     less than a pack a day    Vaping Use    Vaping status: Never Used   Substance Use Topics    Alcohol use: Yes     Comment: a couple of beers 5days per wk    Drug use: No     Family History:  Family History   Problem Relation Age of Onset    C.A.D. Mother         triple bypass 10yrs ago    Diabetes Mother     Eye Disorder Mother         poss    Respiratory Father         poss lung CA    Diabetes Brother     Melanoma No family hx of     Skin Cancer No family hx of        Medications:  Current Facility-Administered Medications   Medication Dose Route Frequency Provider Last Rate Last Admin    enoxaparin ANTICOAGULANT (LOVENOX) injection 40 mg  40 mg Subcutaneous Q24H Tone Mcdonnell PA-C   40 mg at 12/26/24 0801    ipratropium - albuterol 0.5 mg/2.5 mg/3 mL (DUONEB) neb solution 3 mL  3 mL Nebulization TID Tone Mcdonnell PA-C        oseltamivir (TAMIFLU) capsule 75 mg  75 mg Oral BID Tone Mcdonnell PA-C        piperacillin-tazobactam (ZOSYN) 3.375 g vial to attach to  mL bag  3.375 g Intravenous Q6H  Tone Mcdonnell PA-C        thiamine (B-1) injection 200 mg  200 mg Intravenous TID Noemy Jeff MD   200 mg at 12/26/24 0801    [START ON 12/27/2024] vancomycin (VANCOCIN) 1,500 mg in 0.9% NaCl 265 mL intermittent infusion  1,500 mg Intravenous Q24H Danish Garnett MD           Current Facility-Administered Medications   Medication Dose Route Frequency Provider Last Rate Last Admin       Physical Exam:  Temp:  [98.1  F (36.7  C)-100.5  F (38.1  C)] 98.1  F (36.7  C)  Pulse:  [] 96  Resp:  [32-39] 37  BP: ()/() 97/62  SpO2:  [46 %-97 %] 96 %    Intake/Output Summary (Last 24 hours) at 12/26/2024 0811  Last data filed at 12/26/2024 0438  Gross per 24 hour   Intake --   Output 200 ml   Net -200 ml   Not able to complete physical exam, patient consult via chart review    Diagnostics:  All labs and imaging were reviewed, of note:    CMP  Recent Labs   Lab 12/26/24  0821 12/26/24  0559 12/26/24  0446 12/26/24  0213 12/26/24  0203 12/26/24  0150   NA  --   --  133*  --  132* 132*   POTASSIUM  --   --  4.2  --  4.3 4.2   CHLORIDE  --   --  99  --   --  95*   CO2  --   --  21*  --   --  15*   ANIONGAP  --   --  13  --   --  22*   * 175* 206* 281* 283* 285*   BUN  --   --  12.7  --   --  11.1   CR  --   --  1.08  --   --  1.13   GFRESTIMATED  --   --  72  --   --  69   EVANGELINA  --   --  8.0*  --   --  9.0   MAG  --   --   --   --   --  2.1   PROTTOTAL  --   --   --   --   --  7.9   ALBUMIN  --   --   --   --   --  4.1   BILITOTAL  --   --   --   --   --  0.3   ALKPHOS  --   --   --   --   --  105   AST  --   --   --   --   --  28   ALT  --   --   --   --   --  24     CBC  Recent Labs   Lab 12/26/24  0203 12/26/24  0150   WBC  --  7.9   RBC  --  5.04   HGB 15.3 15.9   HCT 45 47.7   MCV  --  95   MCH  --  31.5   MCHC  --  33.3   RDW  --  12.7   PLT  --  252     INR  Recent Labs   Lab 12/26/24  0150   INR 1.04     Arterial Blood Gas  Recent Labs   Lab 12/26/24  0649 12/26/24  0223  "12/26/24  0215   PH  --  7.28*  --    PCO2  --  46*  --    PO2  --  74*  --    HCO3  --  21  --    O2PER 35 30 21       No results found for: \"TROPI\", \"TROPONIN\", \"TROPR\", \"TROPN\"      EKG 12/26/24          Medical Decision Making       60 MINUTES SPENT BY ME on the date of service doing chart review, history, exam, documentation & further activities per the note.       "

## 2024-12-26 NOTE — PLAN OF CARE
Goal Outcome Evaluation:      Plan of Care Reviewed With: patient    Overall Patient Progress: improving         ICU End of Shift Summary     Changes this shift: Taken off BiPAP and tolerating NC without difficulty. Up to chair.     Neuro: A/Ox4, Denies pain  Cardiac: Left bundle branch block on tele, denies chest pain  Respiratory: Requiring 1L O2 via nasal cannula to maintain sats >90%  GI: Advanced to regular diet. Tolerating PO without difficulty   :Voiding spontaneously using the urinal  Skin:Intact  Lines: PIV to RUE and PIV x2 to LUE      Plan:Transfer to EB Med/Surg when bed becomes available    For complete assessments and vital signs, please see flowsheets.

## 2024-12-26 NOTE — ED PROVIDER NOTES
[facility] EMERGENCY DEPARTMENT ([facility])    12/26/24      ED PROVIDER NOTE     History     Chief Complaint   Patient presents with    Respiratory Arrest     HPI  Royal Adriano Duckworth is a 74 year old male with no significant past medical history who presents to the emergency department with worsening shortness of breath over the past day.  Patient was actually transferred over from the pediatric emergency department where he walked in with acute respiratory distress.  Reportedly found to be hypoxic to the 30s with cold extremities and concern for cold exposure/hypothermia.  Patient was placed on BiPAP and given a duo nebulization.  Initial orders included methylprednisolone and IV magnesium but this had not been given yet.  Limited history available due to patient's acute respiratory distress.  However, he did report sudden onset of worsening shortness of breath today.  He drove himself to the emergency department.  He does not have any known medical issues and does not take any medications but has not seen a doctor in a long time.  He denies taking Viagra which is listed on his chart.  He has had a cough but no fever.  He denies chest pain.  He wants to be full code.  Denies history of similar presentation with respiratory concerns.  He reports drinking alcohol but none today.        Past Medical History  Past Medical History:   Diagnosis Date    NO ACTIVE PROBLEMS      Past Surgical History:   Procedure Laterality Date    ZZC APPENDECTOMY       No current outpatient medications on file.    No Known Allergies  Family History  Family History   Problem Relation Age of Onset    C.A.D. Mother         triple bypass 10yrs ago    Diabetes Mother     Eye Disorder Mother         poss    Respiratory Father         poss lung CA    Diabetes Brother     Melanoma No family hx of     Skin Cancer No family hx of      Social History   Social History     Tobacco Use    Smoking status: Every Day     Current packs/day: 1.00  "    Types: Cigarettes    Smokeless tobacco: Never    Tobacco comments:     less than a pack a day    Vaping Use    Vaping status: Never Used   Substance Use Topics    Alcohol use: Yes     Comment: a couple of beers 5days per wk    Drug use: No      A medically appropriate review of systems was performed with pertinent positives and negatives noted in the HPI, and all other systems negative.    Physical Exam   BP: (!) 156/91  Pulse: (!) 135  Temp: 99.9  F (37.7  C)  Resp: (!) 39  Height: 180.3 cm (5' 11\")  Weight: 87.4 kg (192 lb 9.6 oz)  SpO2: (S) (!) 46 %    Physical Exam  Vitals and nursing note reviewed.   Constitutional:       General: He is in acute distress.      Appearance: He is well-developed. He is ill-appearing and toxic-appearing. He is not diaphoretic.   HENT:      Head: Normocephalic and atraumatic.      Nose: Nose normal.      Mouth/Throat:      Mouth: Mucous membranes are dry.   Eyes:      General: No scleral icterus.     Conjunctiva/sclera: Conjunctivae normal.   Cardiovascular:      Rate and Rhythm: Normal rate.   Pulmonary:      Effort: Respiratory distress present.      Breath sounds: No stridor. Wheezing, rhonchi and rales present.   Abdominal:      General: There is no distension.      Palpations: Abdomen is soft.      Tenderness: There is no abdominal tenderness. There is no guarding or rebound.   Musculoskeletal:         General: No deformity or signs of injury. Normal range of motion.      Cervical back: Normal range of motion and neck supple. No rigidity.      Right lower leg: No edema.      Left lower leg: No edema.   Skin:     General: Skin is warm and dry.      Coloration: Skin is not jaundiced or pale.      Findings: No rash.   Neurological:      General: No focal deficit present.      Mental Status: He is alert and oriented to person, place, and time.   Psychiatric:         Attention and Perception: Attention normal.         Mood and Affect: Mood is anxious.         Thought Content: " Thought content normal.      Comments: Patient is restless and anxious, having trouble finding a position of comfort           ED Course, Procedures, & Data      Procedures  POCUS is ordered, but not resulted ***   {ED Course Selections (Optional):211587}         EKG Interpretation:      EKG Number: 1 at 1:45 AM  Interpreted by Noemy Jeff MD  Symptoms at time of EKG: Shortness of breath  Rhythm: A-fib with RVR  Rate: 146  Axis: Normal  Ectopy: Premature atrial complexes  Conduction: Left bundle branch block (incomplete), possible left atrial enlargement  ST Segments/ T Waves: Non-specific ST-T wave changes  Q Waves: None  Comparison to prior: No old EKG available    Clinical Impression: A-fib with RVR           EKG Interpretation:      EKG Number: 2 at 2:00 AM  Interpreted by Noemy Jeff MD  Symptoms at time of EKG: Shortness of breath  Rhythm: A-fib with RVR  Rate: 151  Axis: Normal  Ectopy: Premature atrial complexes  Conduction: Normal   ST Segments/ T Waves: Non-specific ST-T wave changes  Q Waves: None  Comparison to prior: {ECG UNCHANGED:873214}    Clinical Impression: Atrial fibrillation with RVR           EKG Interpretation:      EKG Number: 3.  Done at 2:22 a.m.  Interpreted by Noemy Jeff MD  Symptoms at time of EKG: Shortness of breath  Rhythm: Sinus tachycardia  Rate: 159  Axis: Normal  Ectopy: None  Conduction: Left bundle branch block (incomplete)  ST Segments/ T Waves: No ST-T wave changes and No acute ischemic changes  Q Waves: None  Comparison to prior: {ECG UNCHANGED:312305}    Clinical Impression: sinus tachycardia           EKG Interpretation:      EKG Number: 4.  Done at 3:35 PM.  Interpreted by Noemy Jeff MD  Symptoms at time of EKG: Shortness of breath  Rhythm: Sinus tachycardia  Rate: 125 bpm  Axis: Normal  Ectopy: None  Conduction: Left atrial enlargement, left bundle branch block  ST Segments/ T Waves: No ST-T wave changes and No acute ischemic changes  Q Waves: None  Comparison to  "prior: {ECG UNCHANGED:567138}    Clinical Impression: sinus tachycardia    {ED Sepsis CMS Documentation (Optional):926324::\" \"}  IV Antibiotics given and/or elevated Lactate of 4.3 and no sepsis note found - Delete this reminder and enter the sepsis note or '.edcms' before signing chart.>>>     Results for orders placed or performed during the hospital encounter of 12/26/24   XR Chest Port 1 View     Status: None    Narrative    EXAM: XR CHEST PORT 1 VIEW  LOCATION: [facility]  DATE: 12/26/2024    INDICATION: Dyspnea with hypoxia.  COMPARISON: None.      Impression    IMPRESSION: Pulmonary vascularity at the upper limits of normal. Normal heart size. Interstitial opacities in the subpleural aspect of the left lower lobe concerning for edema. Diffuse interstitial and nodular alveolar infiltrates bilaterally which can   be seen with bilateral pneumonia. Aortic calcification.   CT Chest Pulmonary Embolism w Contrast     Status: None    Narrative    EXAM: CT CHEST PULMONARY EMBOLISM W CONTRAST  LOCATION: [facility]  DATE: 12/26/2024    INDICATION: Acute resp fail  COMPARISON: Chest x-ray on 12/26/2024  TECHNIQUE: CT chest pulmonary angiogram during arterial phase injection of IV contrast. Multiplanar reformats and MIP reconstructions were performed. Dose reduction techniques were used.   CONTRAST: 64mL Isovue 370    FINDINGS:  ANGIOGRAM CHEST: No evidence of pulmonary embolism. No evidence of thoracic aortic aneurysm or dissection. Extensive atherosclerotic vascular calcification of the thoracic aorta. There is no opacification of the proximal aspect of the left subclavian   artery which likely reconstituting distally from collaterals. No evidence for right heart strain.    LUNGS AND PLEURA: Trace right pleural effusion. No significant left pleural effusion. No significant pneumothorax. Upper lobes predominant emphysematous " changes. Mild upper lobes predominant interlobular septal thickening and diffuse groundglass   pulmonary opacities, can be seen with pulmonary edema or infection. Scattered patchy nodular pulmonary opacities most prominent in the right upper lobe, likely infectious. Few scattered pulmonary nodules including 5 mm right upper lobe subpleural nodule   (series 6 image 104).    MEDIASTINUM/AXILLAE: No cardiomegaly or significant pericardial effusion. Multiple prominent mediastinal and hilar lymph nodes, indeterminate, could be reactive.    CORONARY ARTERY CALCIFICATION: Moderate.    UPPER ABDOMEN: Limited evaluation of the upper abdomen due to lack of coverage.    MUSCULOSKELETAL: Multilevel degenerative changes of the spine. No suspicious osseous lesion.      Impression    IMPRESSION:  1.  No evidence of pulmonary embolism.  2.  Bilateral upper lobes predominant interlobular septal thickening with diffuse groundglass pulmonary opacities, can be seen with pulmonary edema or infection. Multiple small patchy nodular groundglass pulmonary opacities, could be infectious.  3.  Trace right pleural effusion.  4.  There is no opacification of the proximal aspect of the left subclavian artery, likely occluded. It appears mildly opacified distally likely reconstituting from collaterals.  5.  A few scattered pulmonary nodules including 5 mm right upper lobe subpleural nodule, as per Fleischner Society criteria, for low risk patient, no routine follow-up is recommended and for high-risk patient, optional chest CT in 12 months can be   considered.                 INR     Status: Normal   Result Value Ref Range    INR 1.04 0.85 - 1.15   Partial thromboplastin time     Status: Normal   Result Value Ref Range    aPTT 30 22 - 38 Seconds   Comprehensive metabolic panel     Status: Abnormal   Result Value Ref Range    Sodium 132 (L) 135 - 145 mmol/L    Potassium 4.2 3.4 - 5.3 mmol/L    Carbon Dioxide (CO2) 15 (L) 22 - 29 mmol/L    Anion  Gap 22 (H) 7 - 15 mmol/L    Urea Nitrogen 11.1 8.0 - 23.0 mg/dL    Creatinine 1.13 0.67 - 1.17 mg/dL    GFR Estimate 69 >60 mL/min/1.73m2    Calcium 9.0 8.8 - 10.4 mg/dL    Chloride 95 (L) 98 - 107 mmol/L    Glucose 285 (H) 70 - 99 mg/dL    Alkaline Phosphatase 105 40 - 150 U/L    AST 28 0 - 45 U/L    ALT 24 0 - 70 U/L    Protein Total 7.9 6.4 - 8.3 g/dL    Albumin 4.1 3.5 - 5.2 g/dL    Bilirubin Total 0.3 <=1.2 mg/dL   Procalcitonin     Status: Normal   Result Value Ref Range    Procalcitonin 0.07 <0.50 ng/mL   Troponin T, High Sensitivity     Status: Normal   Result Value Ref Range    Troponin T, High Sensitivity 17 <=22 ng/L   Nt probnp inpatient (BNP)     Status: Abnormal   Result Value Ref Range    N terminal Pro BNP Inpatient 1,695 (H) 0 - 900 pg/mL   CRP inflammation     Status: Abnormal   Result Value Ref Range    CRP Inflammation 31.99 (H) <5.00 mg/L   Gordon Draw     Status: None    Narrative    The following orders were created for panel order Gordon Draw.  Procedure                               Abnormality         Status                     ---------                               -----------         ------                     Extra Blue Top Tube[[id number]]                                                         Extra Green Top (Lithium...[[id number]]                                                 Extra Purple Top Tube[[id number]]                            Final result               Extra Purple Top Tube[[id number]]                                                         Please view results for these tests on the individual orders.   CBC with platelets and differential     Status: None   Result Value Ref Range    WBC Count 7.9 4.0 - 11.0 10e3/uL    RBC Count 5.04 4.40 - 5.90 10e6/uL    Hemoglobin 15.9 13.3 - 17.7 g/dL    Hematocrit 47.7 40.0 - 53.0 %    MCV 95 78 - 100 fL    MCH 31.5 26.5 - 33.0 pg    MCHC 33.3 31.5 - 36.5 g/dL    RDW 12.7 10.0 - 15.0 %    Platelet Count 252 150 - 450 10e3/uL    %  Neutrophils 59 %    % Lymphocytes 29 %    % Monocytes 10 %    % Eosinophils 0 %    % Basophils 1 %    % Immature Granulocytes 1 %    NRBCs per 100 WBC 0 <1 /100    Absolute Neutrophils 4.6 1.6 - 8.3 10e3/uL    Absolute Lymphocytes 2.3 0.8 - 5.3 10e3/uL    Absolute Monocytes 0.8 0.0 - 1.3 10e3/uL    Absolute Eosinophils 0.0 0.0 - 0.7 10e3/uL    Absolute Basophils 0.1 0.0 - 0.2 10e3/uL    Absolute Immature Granulocytes 0.1 <=0.4 10e3/uL    Absolute NRBCs 0.0 10e3/uL   Extra Purple Top Tube     Status: None   Result Value Ref Range    Hold Specimen [facility]    Blood gas venous     Status: Abnormal   Result Value Ref Range    pH Venous 7.14 (LL) 7.32 - 7.43    pCO2 Venous 64 (H) 40 - 50 mm Hg    pO2 Venous 57 (H) 25 - 47 mm Hg    Bicarbonate Venous 22 21 - 28 mmol/L    Base Excess/Deficit Venous -8.7 (L) -3.0 - 3.0 mmol/L    FIO2 21     Oxyhemoglobin Venous 78 (H) 70 - 75 %    O2 Sat, Venous 79.8 (H) 70.0 - 75.0 %    Lactic Acid 4.3 (HH) 0.7 - 2.0 mmol/L    Narrative    In healthy individuals, oxyhemoglobin (O2Hb) and oxygen saturation (SO2) are approximately equal. In the presence of dyshemoglobins, oxyhemoglobin can be considerably lower than oxygen saturation.   iStat Gases (lactate) venous, POCT     Status: Abnormal   Result Value Ref Range    Lactic Acid POCT 4.1 (HH) <=2.0 mmol/L    Bicarbonate Venous POCT 22 21 - 28 mmol/L    O2 Sat, Venous POCT 77 (H) 70 - 75 %    pCO2 Venous POCT 66 (H) 40 - 50 mm Hg    pH Venous POCT 7.13 (LL) 7.32 - 7.43    pO2 Venous POCT 56 (H) 25 - 47 mm Hg    Base Excess/Deficit (+/-) POCT -9.0 (L) -3.0 - 3.0 mmol/L   Blood gas arterial (ABG)     Status: Abnormal   Result Value Ref Range    pH Arterial 7.28 (L) 7.35 - 7.45    pCO2 Arterial 46 (H) 35 - 45 mm Hg    pO2 Arterial 74 (L) 80 - 105 mm Hg    FIO2 30     Bicarbonate Arterial 21 21 - 28 mmol/L    Base Excess/Deficit Arterial -5.6 (L) -3.0 - 3.0 mmol/L    Arturo's Test Yes     Oxyhemoglobin Arterial 92 92 - 100 %    O2 Sat, Arterial 93.7  (L) 95.0 - 96.0 %    Narrative    In healthy individuals, oxyhemoglobin (O2Hb) and oxygen saturation (SO2) are approximately equal. In the presence of dyshemoglobins, oxyhemoglobin can be considerably lower than oxygen saturation.   D dimer quantitative     Status: Abnormal   Result Value Ref Range    D-Dimer Quantitative 1.29 (H) 0.00 - 0.50 ug/mL FEU    Narrative    This D-dimer assay is intended for use in conjunction with a clinical pretest probability assessment model to exclude pulmonary embolism (PE) and deep venous thrombosis (DVT) in outpatients suspected of PE or DVT. The cut-off value is 0.50 ug/mL FEU.    For patients 50 years of age or older, the application of age-adjusted cut-off values for D-Dimer may increase the specificity without significant effect on sensitivity. The literature suggested calculation age adjusted cut-off in ug/L = age in years x 10 ug/L. The results in this laboratory are reported as ug/mL rather than ug/L. The calculation for age adjusted cut off in ug/mL= age in years x 0.01 ug/mL. For example, the cut off for a 76 year old male is 77 x 0.01 ug/mL = 0.76 ug/mL (760 ug/L).    M Vasyl et al. Age adjusted D-dimer cut-off levels to rule out pulmonary embolism: The ADJUST-PE Study. PAUL 2014;311:5702-0229.; HJ Kirsten et al. Diagnostic accuracy of conventional or age adjusted D-dimer cutoff values in older patients with suspected venous thromboembolism. Systemic review and meta-analysis. BMJ 2013:346:f2492.   Glucose by meter     Status: Abnormal   Result Value Ref Range    GLUCOSE BY METER POCT 281 (H) 70 - 99 mg/dL   Magnesium     Status: Normal   Result Value Ref Range    Magnesium 2.1 1.7 - 2.3 mg/dL   TSH with free T4 reflex     Status: Normal   Result Value Ref Range    TSH 1.87 0.30 - 4.20 uIU/mL   Ethyl Alcohol Level     Status: Normal   Result Value Ref Range    Alcohol ethyl <0.01 <=0.01 g/dL   Influenza A/B, RSV and SARS-CoV2 PCR (COVID-19) Nasopharyngeal     Status:  Abnormal    Specimen: Nasopharyngeal; Swab   Result Value Ref Range    Influenza A PCR Positive (A) Negative    Influenza B PCR Negative Negative    RSV PCR Negative Negative    SARS CoV2 PCR Negative Negative    Narrative    Testing was performed using the Xpert Xpress CoV2/Flu/RSV Assay on the Cepheid GeneXpert Instrument. This test should be ordered for the detection of SARS-CoV2, influenza, and RSV viruses in individuals with signs and symptoms of respiratory tract infection. This test is for in vitro diagnostic use under the [place] FDA for laboratories certified under CLIA to perform high or moderate complexity testing. This test has been [place] FDA cleared. A negative result does not rule out the presence of PCR inhibitors in the specimen or target RNA in concentration below the limit of detection for the assay. If only one viral target is positive but coinfection with multiple targets is suspected, the sample should be re-tested with another FDA cleared, approved, or authorized test, if coninfection would change clinical management. This test was validated by the [facility]. These laboratories are certified under the Clinical Laboratory Improvement Amendments of 1988 (CLIA-88) as qualified to perfom high complexity laboratory testing.   iStat Gases Electrolytes ICA Glucose Venous, POCT     Status: Abnormal   Result Value Ref Range    CPB Applied No     Hematocrit POCT 45 40 - 53 %    Calcium, Ionized Whole Blood POCT 4.9 4.4 - 5.2 mg/dL    Glucose Whole Blood POCT 283 (H) 70 - 99 mg/dL    Bicarbonate Venous POCT 21 21 - 28 mmol/L    Hemoglobin POCT 15.3 13.3 - 17.7 g/dL    Potassium POCT 4.3 3.4 - 5.3 mmol/L    Sodium POCT 132 (L) 135 - 145 mmol/L    pCO2 Venous POCT 64 (H) 40 - 50 mm Hg    pO2 Venous POCT 57 (H) 25 - 47 mm Hg    pH Venous POCT 7.13 (LL) 7.32 - 7.43    O2 Sat, Venous POCT 78 (H) 70 - 75 %    Base Excess/Deficit (+/-) POCT -9.0 (L) -3.0 - 3.0 mmol/L   Troponin T, High Sensitivity      Status: Abnormal   Result Value Ref Range    Troponin T, High Sensitivity 61 (H) <=22 ng/L   Ketone Beta-Hydroxybutyrate Quantitative     Status: Abnormal   Result Value Ref Range    Ketone (Beta-Hydroxybutyrate) Quantitative 0.60 (H) <=0.30 mmol/L   Lactic acid whole blood     Status: Normal   Result Value Ref Range    Lactic Acid 1.8 0.7 - 2.0 mmol/L   Basic metabolic panel     Status: Abnormal   Result Value Ref Range    Sodium 133 (L) 135 - 145 mmol/L    Potassium 4.2 3.4 - 5.3 mmol/L    Chloride 99 98 - 107 mmol/L    Carbon Dioxide (CO2) 21 (L) 22 - 29 mmol/L    Anion Gap 13 7 - 15 mmol/L    Urea Nitrogen 12.7 8.0 - 23.0 mg/dL    Creatinine 1.08 0.67 - 1.17 mg/dL    GFR Estimate 72 >60 mL/min/1.73m2    Calcium 8.0 (L) 8.8 - 10.4 mg/dL    Glucose 206 (H) 70 - 99 mg/dL   Osmolality     Status: Normal   Result Value Ref Range    Osmolality Blood 285 280 - 301 mmol/kg    Narrative    Greater than 385 mmol/kg relates to stupor in hyperglycemia   Greater than 400 mmol/kg can relate to seizures   Greater than 420 mmol/kg can be lethal    Serum Osmalar Gap:   Normal <10   Larger suggest unmeasured substances present in serum (ethanol, methanol, isopropanol, mannitol, ethylene glycol).   UA with Microscopic reflex to Culture     Status: Abnormal    Specimen: Urine, Midstream   Result Value Ref Range    Color Urine Straw Colorless, Straw, Light Yellow, Yellow    Appearance Urine Clear Clear    Glucose Urine 300 (A) Negative mg/dL    Bilirubin Urine Negative Negative    Ketones Urine Negative Negative mg/dL    Specific Gravity Urine 1.018 1.003 - 1.035    Blood Urine Negative Negative    pH Urine 5.5 5.0 - 7.0    Protein Albumin Urine Negative Negative mg/dL    Urobilinogen Urine Normal Normal, 2.0 mg/dL    Nitrite Urine Negative Negative    Leukocyte Esterase Urine Negative Negative    Mucus Urine Present (A) None Seen /LPF    RBC Urine 1 <=2 /HPF    WBC Urine <1 <=5 /HPF    Hyaline Casts Urine 9 (H) <=2 /LPF     Narrative    Urine Culture not indicated   Blood gas venous     Status: Abnormal   Result Value Ref Range    pH Venous 7.43 7.32 - 7.43    pCO2 Venous 38 (L) 40 - 50 mm Hg    pO2 Venous 61 (H) 25 - 47 mm Hg    Bicarbonate Venous 25 21 - 28 mmol/L    Base Excess/Deficit Venous 0.7 -3.0 - 3.0 mmol/L    FIO2 35     Oxyhemoglobin Venous 91 (H) 70 - 75 %    O2 Sat, Venous 92.5 (H) 70.0 - 75.0 %    Narrative    In healthy individuals, oxyhemoglobin (O2Hb) and oxygen saturation (SO2) are approximately equal. In the presence of dyshemoglobins, oxyhemoglobin can be considerably lower than oxygen saturation.   Troponin T, High Sensitivity     Status: Abnormal   Result Value Ref Range    Troponin T, High Sensitivity 196 (HH) <=22 ng/L   Glucose by meter     Status: Abnormal   Result Value Ref Range    GLUCOSE BY METER POCT 175 (H) 70 - 99 mg/dL   Glucose by meter     Status: Abnormal   Result Value Ref Range    GLUCOSE BY METER POCT 179 (H) 70 - 99 mg/dL   Hemoglobin A1c     Status: Normal   Result Value Ref Range    Estimated Average Glucose 111 <117 mg/dL    Hemoglobin A1C 5.5 <5.7 %   EKG 12 lead     Status: None   Result Value Ref Range    Systolic Blood Pressure  mmHg    Diastolic Blood Pressure  mmHg    Ventricular Rate 159 BPM    Atrial Rate 170 BPM    AK Interval  ms    QRS Duration 126 ms     ms    QTc 517 ms    P Axis  degrees    R AXIS -2 degrees    T Axis 113 degrees    Interpretation ECG       Undetermined rhythm  Left bundle branch block  Abnormal ECG  Unconfirmed report - interpretation of this ECG is computer generated - see medical record for final interpretation    Confirmed by - EMERGENCY ROOM, PHYSICIAN ([id number]),  KRISTYN PRICE ([id number]) on 12/26/2024 8:10:13 AM     EKG 12 lead     Status: None   Result Value Ref Range    Systolic Blood Pressure  mmHg    Diastolic Blood Pressure  mmHg    Ventricular Rate 125 BPM    Atrial Rate 125 BPM    AK Interval 190 ms    QRS Duration 126 ms     ms    QTc  447 ms    P Axis 52 degrees    R AXIS 3 degrees    T Axis 151 degrees    Interpretation ECG       Sinus tachycardia  Left atrial enlargement  Left bundle branch block  Abnormal ECG  Unconfirmed report - interpretation of this ECG is computer generated - see medical record for final interpretation    Confirmed by - EMERGENCY ROOM, PHYSICIAN ([id number]),  KRISTYN PRICE ([id number]) on 12/26/2024 8:11:09 AM     EKG 12-lead, complete     Status: None   Result Value Ref Range    Systolic Blood Pressure  mmHg    Diastolic Blood Pressure  mmHg    Ventricular Rate 86 BPM    Atrial Rate 86 BPM    NM Interval 180 ms    QRS Duration 136 ms     ms    QTc 543 ms    P Axis 71 degrees    R AXIS 1 degrees    T Axis 126 degrees    Interpretation ECG       Sinus rhythm with Premature supraventricular complexes  Left bundle branch block  Abnormal ECG  When compared with ECG of 26-Dec-2024 03:35, (unconfirmed)  Premature supraventricular complexes are now Present  Confirmed by MD JAYA, SUNSHINE ([id number]) on 12/26/2024 9:30:43 AM     EKG 12-lead, complete     Status: None (Preliminary result)   Result Value Ref Range    Systolic Blood Pressure  mmHg    Diastolic Blood Pressure  mmHg    Ventricular Rate 122 BPM    Atrial Rate 122 BPM    NM Interval 208 ms    QRS Duration 132 ms     ms    QTc 504 ms    P Axis 37 degrees    R AXIS 16 degrees    T Axis 146 degrees    Interpretation ECG       Sinus tachycardia with Premature supraventricular complexes  Left bundle branch block  Abnormal ECG  When compared with ECG of 26-Dec-2024 07:45,  No significant change was found     Respiratory Panel PCR     Status: Abnormal    Specimen: Nasopharyngeal; Swab   Result Value Ref Range    Adenovirus Not Detected Not Detected    Coronavirus Not Detected Not Detected    Human Metapneumovirus Not Detected Not Detected    Human Rhin/Enterovirus Not Detected Not Detected    Influenza A Detected (A) Not Detected    Influenza A, H1 Not Detected Not Detected     Influenza A 2009 H1N1 Detected (A) Not Detected    Influenza A, H3 Not Detected Not Detected    Influenza B Not Detected Not Detected    Parainfluenza Virus 1 Not Detected Not Detected    Parainfluenza Virus 2 Not Detected Not Detected    Parainfluenza Virus 3 Not Detected Not Detected    Parainfluenza Virus 4 Not Detected Not Detected    Respiratory Syncytial Virus A Not Detected Not Detected    Respiratory Syncytial Virus B Not Detected Not Detected    Chlamydia Pneumoniae Not Detected Not Detected    Mycoplasma Pneumoniae Not Detected Not Detected    Narrative    The [facility] Respiratory Panel is a qualitative nucleic acid, multiplex, in vitro diagnostic test for the simultaneous detection and identification of multiple respiratory viral and bacterial nucleic acids in nasopharyngeal swabs collected in viral transport media from individual exhibiting signs and symptoms of respiratory infection. The assay has received FDA approval for the testing of nasopharyngeal (NP) swabs only. This test is used for clinical purposes and should not be regarded as investigational or for research. This laboratory is certified under the Clinical Laboratory Improvement Amendments of 1988 (CLIA-88) as qualified to perform high complexity clinical laboratory testing.   CBC with platelets differential     Status: None    Narrative    The following orders were created for panel order CBC with platelets differential.  Procedure                               Abnormality         Status                     ---------                               -----------         ------                     CBC with platelets and d...[[id number]]                      Final result                 Please view results for these tests on the individual orders.     Medications   nitroGLYcerin (NITROSTAT) sublingual tablet 0.4 mg (0.4 mg Sublingual $Given 12/26/24 8066)   thiamine (B-1) injection 200 mg (200 mg Intravenous $Given 12/26/24 0801)   glucose gel  15-30 g (has no administration in time range)     Or   dextrose 50 % injection 25-50 mL (has no administration in time range)     Or   glucagon injection 1 mg (has no administration in time range)   enoxaparin ANTICOAGULANT (LOVENOX) injection 40 mg (40 mg Subcutaneous $Given 12/26/24 0801)   piperacillin-tazobactam (ZOSYN) 3.375 g vial to attach to  mL bag (3.375 g Intravenous $New Bag 12/26/24 1014)   oseltamivir (TAMIFLU) capsule 75 mg (has no administration in time range)   acetaminophen (TYLENOL) tablet 975 mg (has no administration in time range)   ipratropium - albuterol 0.5 mg/2.5 mg/3 mL (DUONEB) neb solution 3 mL (3 mLs Nebulization $Given 12/26/24 0826)   vancomycin (VANCOCIN) 1,500 mg in 0.9% NaCl 265 mL intermittent infusion (has no administration in time range)   ondansetron (ZOFRAN) injection 4 mg (has no administration in time range)   polyethylene glycol (MIRALAX) Packet 17 g (has no administration in time range)   sennosides (SENOKOT) tablet 8.6 mg (has no administration in time range)   ipratropium - albuterol 0.5 mg/2.5 mg/3 mL (DUONEB) neb solution 3 mL (3 mLs Nebulization $Given 12/26/24 0236)   methylPREDNISolone Na Suc (solu-MEDROL) injection 125 mg (125 mg Intravenous $Given 12/26/24 0218)   furosemide (LASIX) injection 40 mg (40 mg Intravenous $Given 12/26/24 0233)   diltiazem (CARDIZEM) injection 20.2 mg (20.2 mg Intravenous $Given 12/26/24 0233)   furosemide (LASIX) injection 40 mg (40 mg Intravenous $Given 12/26/24 0252)   sodium chloride 0.9 % bag 100 mL for CT (84 mLs Intravenous $Given 12/26/24 0309)   iopamidol (ISOVUE-370) solution 100 mL (64 mLs Intravenous $Given 12/26/24 0323)   piperacillin-tazobactam (ZOSYN) 3.375 g vial to attach to  mL bag (0 g Intravenous Stopped 12/26/24 1747)   azithromycin (ZITHROMAX) tablet 500 mg (500 mg Oral $Given 12/26/24 2103)   folic acid injection 1 mg (1 mg Intravenous $Given 12/26/24 0383)   LORazepam (ATIVAN) injection 1 mg (1 mg  Intravenous $Given 12/26/24 0325)   vancomycin (VANCOCIN) 1,750 mg in sodium chloride 0.9 % 567.5 mL intermittent infusion (1,750 mg Intravenous $Given 12/26/24 0404)   oseltamivir (TAMIFLU) capsule 75 mg (75 mg Oral $Given 12/26/24 0429)   sodium chloride 0.9% BOLUS 250 mL (0 mLs Intravenous Stopped 12/26/24 0403)   acetaminophen (TYLENOL) tablet 975 mg (975 mg Oral $Given 12/26/24 0412)   sodium chloride 0.9% BOLUS 250 mL (0 mLs Intravenous Stopped 12/26/24 0537)     Labs Ordered and Resulted from Time of ED Arrival to Time of ED Departure   CBC WITH PLATELETS AND DIFFERENTIAL       Result Value    WBC Count 7.9      RBC Count 5.04      Hemoglobin 15.9      Hematocrit 47.7      MCV 95      MCH 31.5      MCHC 33.3      RDW 12.7      Platelet Count 252      % Neutrophils 59      % Lymphocytes 29      % Monocytes 10      % Eosinophils 0      % Basophils 1      % Immature Granulocytes 1      NRBCs per 100 WBC 0      Absolute Neutrophils 4.6      Absolute Lymphocytes 2.3      Absolute Monocytes 0.8      Absolute Eosinophils 0.0      Absolute Basophils 0.1      Absolute Immature Granulocytes 0.1      Absolute NRBCs 0.0       CT Chest Pulmonary Embolism w Contrast   Final Result   IMPRESSION:   1.  No evidence of pulmonary embolism.   2.  Bilateral upper lobes predominant interlobular septal thickening with diffuse groundglass pulmonary opacities, can be seen with pulmonary edema or infection. Multiple small patchy nodular groundglass pulmonary opacities, could be infectious.   3.  Trace right pleural effusion.   4.  There is no opacification of the proximal aspect of the left subclavian artery, likely occluded. It appears mildly opacified distally likely reconstituting from collaterals.   5.  A few scattered pulmonary nodules including 5 mm right upper lobe subpleural nodule, as per Fleischner Society criteria, for low risk patient, no routine follow-up is recommended and for high-risk patient, optional chest CT in 12  months can be    considered.                         XR Chest Port 1 View   Final Result   IMPRESSION: Pulmonary vascularity at the upper limits of normal. Normal heart size. Interstitial opacities in the subpleural aspect of the left lower lobe concerning for edema. Diffuse interstitial and nodular alveolar infiltrates bilaterally which can    be seen with bilateral pneumonia. Aortic calcification.      POC US ECHO LIMITED    (Results Pending)   POC US ECHO LIMITED    (Results Pending)   Echo Complete    (Results Pending)          Critical Care Addendum  My initial assessment, based on my review of prehospital provider report, review of nursing observations, review of vital signs, focused history, physical exam, review of cardiac rhythm monitor, 12 lead ECG analysis, and discussion with Pediatric ED staff , established a high suspicion that Royal Adriano Duckworth has sepsis with indication for early goal-directed therapy, respiratory distress, and a critical arrhythmia, which requires immediate intervention, and therefore he is critically ill.     After the initial assessment, the care team initiated multiple lab tests, initiated IV fluid administration, initiated medication therapy with diltiazem, nitroglycerin, tamiflu, lasix, methylpred, and initiated intensive non-invasive respiratory support to provide stabilization care. Due to the critical nature of this patient, I reassessed nursing observations, vital signs, physical exam, review of cardiac rhythm monitor, 12 lead ECG analysis, mental status, neurologic status, and respiratory status multiple times prior to his disposition.     Time also spent performing documentation, reviewing test results, discussion with consultants, and coordination of care.     Critical care time (excluding teaching time and procedures): 98 minutes.       Assessment & Plan    Royal Adriano Duckworth is a 74 year old male with no significant past medical history who presents to the emergency  department with worsening shortness of breath over the past day. ***    Ddx: Atrial fibrillation with RVR, flash pulmonary edema, CHF exacerbation, COPD exacerbation, pneumonia, aspiration, alcohol withdrawal syndrome, sepsis, pulmonary embolism, multifocal pneumonia, ARDS, acute coronary syndrome, acute hypoxic and hypercapnic respiratory failure    Reviewed EKG obtained by the pediatric emergency department prior to transfer.  It looks like this showed a left bundle branch block and atrial fibrillation with RVR.  On ultrasound patient had bilateral B-lines concerning for pulmonary edema.  Portable chest x-ray also concerning for significant pulmonary edema.  Given the rapid onset of symptoms and lack of infectious symptoms or chest pain my #1 clinical suspicion is for acute CHF exacerbation potentially triggered by atrial fibrillation with RVR.  Management is challenging without previous medical history.  Patient did have wheezing on examination and was given duo nebulizations without notable improvement in symptoms.  Ordered IV magnesium and methylprednisolone.  There were significant issues with pharmacy sending down the IV magnesium for acute respiratory distress. I spoke with the pharmacist multiple times but the med was delayed a few hours and at the time that it was finally approved, I did not think it would contribute significantly to the patient's management.  Patient given IV Lasix 40 mg.  Sublingual nitroglycerin, 20 mg IV diltiazem.  Heart rate improved from the 160s over 140s with diltiazem.  Blood pressure came down to the 120s systolic.  Patient did not experience any symptomatic improvement.  Initial troponin 17.  Therefore less likely ACS as the primary etiology for presentation. Spoke with the [facility] staff on the phone who recommended giving an additional dose of IV Lasix.  She felt it would be okay to start the patient on a diltiazem drip since he did not have cardiomegaly on chest x-ray.       The patient's presentation with hypertension, restlessness, diaphoresis, tachycardia, I questioned whether an alcohol withdrawal syndrome could be contributing.  He was given 1 mg Ativan IV with improvement of his heart rate down to the 120s but dropping pressure to the 90s.  Maps remained in the 70s.  Blood pressures remained on the lower side so discontinued the diltiazem infusion.    CT showed no pulmonary embolism.  There were bilateral upper lobe predominant interlobular septal thickening with diffuse groundglass pulmonary opacities which could be seen with pulmonary edema or infection.  Trace right pleural effusion.  Procalcitonin negative.  BNP elevated to 1695.  Lactate 4.1.  Covered empirically for sepsis/pneumonia with vancomycin, Zosyn, azithromycin.  Viral study came back positive for influenza A.  Given Tamiflu.  Repeat temperature check showed patient had a fever to 100.5. repeat point-of-care ultrasound showed a collapsing IVC and organized cardiac contractility with spontaneous conversion to sinus rhythm.  Given 250 cc of IV fluid with improvement of heart rate down to the 110s. given an additional 250 cc of fluid.  Given Tylenol for the fever.      Reassessment, patient now much more calm and breathing comfortably, able to rest on the BiPAP.  Much less restless.  In light of the new data and response to treatment I think patient's primary issue is septic pneumonia and influenza.  He spontaneously converted to sinus tachycardia during the course of treatment.  The troponin monique slightly on recheck which I suspect is secondary to demand in the setting of significant tachycardia.  Admitted to the [facility] ICU for ongoing management.      I have reviewed the nursing notes. I have reviewed the findings, diagnosis, plan and need for follow up with the patient.    Current Discharge Medication List          Final diagnoses:   Acute hypoxemic respiratory failure (H)       Noemy Jeff MD  [facility]  [facility] EMERGENCY DEPARTMENT  12/26/2024      for research. This laboratory is certified under the Clinical Laboratory Improvement Amendments of 1988 (CLIA-88) as qualified to perform high complexity clinical laboratory testing.   Blood Culture Peripheral Blood     Status: Normal (Preliminary result)    Specimen: Peripheral Blood   Result Value Ref Range    Culture No growth after 1 day    Blood Culture Arm, Right     Status: Normal (Preliminary result)    Specimen: Arm, Right; Blood   Result Value Ref Range    Culture No growth after 1 day    MRSA MSSA PCR, Nasal Swab     Status: None    Specimen: Nare, Left; Swab   Result Value Ref Range    MRSA Target DNA Negative Negative    SA Target DNA Negative     Narrative    The [employer]  Xpert SA Nasal Complete assay performed in the [employer]  Dx System is a qualitative in vitro diagnostic test designed for rapid detection of Staphylococcus aureus (SA) and methicillin-resistant Staphylococcus aureus (MRSA) from nasal swabs in patients at risk for nasal colonization. The test utilizes automated real-time polymerase chain reaction (PCR) to detect MRSA/SA DNA. The Xpert SA Nasal Complete assay is intended to aid in the prevention and control of MRSA/SA infections in healthcare settings. The assay is not intended to diagnose, guide or monitor treatment for MRSA/SA infections, or provide results of susceptibility to methicillin. A negative result does not preclude MRSA/SA nasal colonization.    CT CORONARY ARTERY ANGIO W CALCIUM SCORE     Status: None    Narrative    Procedure: CT CORONARY ARTERY ANGIO W CALCIUM SCORE   Examination Date: 12/27/2024 3:11 PM   Indication: new LBBB, elevated troponin   Ordering Provider: Chaya Mosqueda MD  Overall quality of the study: Fair. There is significant cardiac  motion artifact.    PROCEDURE: ECG gated multi-slice computed tomography of the heart with  and without intravenous contrast  (Isovue 370, 220 mL at rate of 5.5  mL/sec) was performed on a Siemens Dual Source Flash  scanner without  incident. Medical therapy was used to optimize heart rate (metoprolol  50 mg oral, ivabradine 10 oral,  metoprolol 20 mg IV). Sublingual  Nitrostat 0.8 mg was given prior to scanning. Coronary artery calcium  scoring was performed using the Flash scanner protocol. The CTA  portion was performed in the spiral mode at a heart rate of 72 bpm  with 100 kVp. Images were reconstructed and analyzed on a [employer]  workstation. The scan protocol was optimized to minimize radiation  exposure. The total radiation exposure, including calcium artery  calcium scoring, was calculated to be 1025 DLP and 14.35 mSv.        Impression    IMPRESSION:  1.  Technically suboptimal study due to significant cardiac motion  artifact related to insufficiency heart rate control despite maximal  pre-medication and frequent PVCs. Diagnostic accuracy may be reduced.  2.  Mild nonobstructive coronary artery disease.  3.  Non-diagnostic imaging of the distal RCA.  4.  Minimal nonobstructive coronary artery disease in the LAD, LCX and  Mild stenosis in the visualized segments of the RCA  5.  Total Agatston score 679 placing the patient in the 74 percentile  when compared to an age- and gender-matched control group.  6.  Please review the separate Radiology report for incidental  noncardiac findings.    FINDINGS:    CORONARY CALCIUM SCORE    Total Agatston calcium score: 679   Left main: 211  Left anterior descendin  Left circumflex: 47  Right coronary artery: 342   This places the patient in the 74  percentile when compared to an age-  and gender-matched control group.    CORONARY ANGIOGRAPHY    DOMINANCE: Right dominant system.   Normal coronary origins and course.    LEFT MAIN:   The LM is a moderate caliber vessel.   The left main arises normally from the left coronary cusp and has a  minimal (<25%) stenosis composed of mixed plaque.    LEFT ANTERIOR DESCENDING:   The LAD is a moderate caliber vessel. It has a type III  morphology. It  gives rise to small caliber D1,D2 and D3.     Proximal LAD: Minimal (<25%) stenosis composed of noncalcified plaque.  Mid LAD: Minimal (<25%) stenosis composed of calcified plaque..  The remainder of the left anterior descending and its major diagonal  branches are patent with minimal luminal irregularities.    LEFT CIRCUMFLEX:   The LCx is a small caliber vessel. It gives rise to a small caliber  OM1  Proximal LCX: Minimal (<25%) stenosis composed of mixed plaque.  Mid LCX: Minimal (<25%) stenosis composed of mixed plaque.  The remainder of the left circumflex and its major marginal branches  are patent with minimal luminal irregularities.    RIGHT CORONARY ARTERY:   The RCA is a moderate caliber vessel.    Proximal RCA: Mild (25-49%) stenosis composed of mixed plaque.  Mid RCA: : Minimal (<25%) stenosis composed of calcified plaque  Distal RCA: This segment is non diagnostic due to cardiac motion  artifact    ADDITIONAL FINDINGS:   The proximal ascending aorta is normal in size.   Normal pulmonary venous anatomy with all four pulmonary veins draining  into the left atrium.    The left atrial appendage is free of thrombus.  There is no left ventricular mass or thrombus.   Normal pericardial thickness. There is no pericardial effusion.  Please review the separate Radiology report for incidental noncardiac  findings.    I have personally reviewed the examination and initial interpretation  and I agree with the findings.    FRANCINE MOULTON MD         SYSTEM ID:  [id number]   CBC with platelets differential     Status: None    Narrative    The following orders were created for panel order CBC with platelets differential.  Procedure                               Abnormality         Status                     ---------                               -----------         ------                     CBC with platelets and d...[[id number]]                      Final result                 Please view results  for these tests on the individual orders.     Medications   nitroGLYcerin (NITROSTAT) sublingual tablet 0.4 mg (0.4 mg Sublingual $Given 12/26/24 0223)   glucose gel 15-30 g (has no administration in time range)     Or   dextrose 50 % injection 25-50 mL (has no administration in time range)     Or   glucagon injection 1 mg (has no administration in time range)   enoxaparin ANTICOAGULANT (LOVENOX) injection 40 mg (40 mg Subcutaneous $Given 12/27/24 0837)   oseltamivir (TAMIFLU) capsule 75 mg (75 mg Oral $Given 12/27/24 2019)   acetaminophen (TYLENOL) tablet 975 mg (975 mg Oral $Given 12/27/24 2211)   ondansetron (ZOFRAN) injection 4 mg (has no administration in time range)   polyethylene glycol (MIRALAX) Packet 17 g (has no administration in time range)   sennosides (SENOKOT) tablet 8.6 mg (has no administration in time range)   azithromycin (ZITHROMAX) 500 mg in sodium chloride 0.9 % 250 mL intermittent infusion (500 mg Intravenous $New Bag 12/27/24 0612)   ipratropium - albuterol 0.5 mg/2.5 mg/3 mL (DUONEB) neb solution 3 mL (3 mLs Nebulization $Given 12/27/24 1734)   cefTRIAXone (ROCEPHIN) 2 g vial to attach to  ml bag for ADULTS or NS 50 ml bag for PEDS (2 g Intravenous $New Bag 12/27/24 1525)   ipratropium - albuterol 0.5 mg/2.5 mg/3 mL (DUONEB) neb solution 3 mL (3 mLs Nebulization $Given 12/26/24 0236)   methylPREDNISolone Na Suc (solu-MEDROL) injection 125 mg (125 mg Intravenous $Given 12/26/24 0218)   furosemide (LASIX) injection 40 mg (40 mg Intravenous $Given 12/26/24 0233)   diltiazem (CARDIZEM) injection 20.2 mg (20.2 mg Intravenous $Given 12/26/24 0233)   furosemide (LASIX) injection 40 mg (40 mg Intravenous $Given 12/26/24 0252)   sodium chloride 0.9 % bag 100 mL for CT (84 mLs Intravenous $Given 12/26/24 0984)   iopamidol (ISOVUE-370) solution 100 mL (64 mLs Intravenous $Given 12/26/24 6277)   piperacillin-tazobactam (ZOSYN) 3.375 g vial to attach to  mL bag (0 g Intravenous Stopped  12/26/24 0437)   azithromycin (ZITHROMAX) tablet 500 mg (500 mg Oral $Given 12/26/24 0412)   folic acid injection 1 mg (1 mg Intravenous $Given 12/26/24 0357)   thiamine (B-1) injection 200 mg (200 mg Intravenous $Given 12/27/24 2018)   LORazepam (ATIVAN) injection 1 mg (1 mg Intravenous $Given 12/26/24 0325)   vancomycin (VANCOCIN) 1,750 mg in sodium chloride 0.9 % 567.5 mL intermittent infusion (1,750 mg Intravenous $Given 12/26/24 0404)   oseltamivir (TAMIFLU) capsule 75 mg (75 mg Oral $Given 12/26/24 0429)   sodium chloride 0.9% BOLUS 250 mL (0 mLs Intravenous Stopped 12/26/24 0403)   acetaminophen (TYLENOL) tablet 975 mg (975 mg Oral $Given 12/26/24 0412)   sodium chloride 0.9% BOLUS 250 mL (0 mLs Intravenous Stopped 12/26/24 0537)   perflutren diluted in saline (DEFINITY) injection 7 mL (7 mLs Intravenous $Given 12/26/24 1354)   metoprolol succinate ER (TOPROL XL) 24 hr tablet 25 mg (25 mg Oral $Given 12/27/24 0837)   metoprolol tartrate (LOPRESSOR) tablet 50 mg (50 mg Oral $Given 12/27/24 1331)   ivabradine (CORLANOR) tablet 10 mg (10 mg Oral $Given 12/27/24 1331)   iopamidol (ISOVUE-370) solution 110 mL (110 mLs Intravenous $Given 12/27/24 1438)     Labs Ordered and Resulted from Time of ED Arrival to Time of ED Departure   CBC WITH PLATELETS AND DIFFERENTIAL       Result Value    WBC Count 7.9      RBC Count 5.04      Hemoglobin 15.9      Hematocrit 47.7      MCV 95      MCH 31.5      MCHC 33.3      RDW 12.7      Platelet Count 252      % Neutrophils 59      % Lymphocytes 29      % Monocytes 10      % Eosinophils 0      % Basophils 1      % Immature Granulocytes 1      NRBCs per 100 WBC 0      Absolute Neutrophils 4.6      Absolute Lymphocytes 2.3      Absolute Monocytes 0.8      Absolute Eosinophils 0.0      Absolute Basophils 0.1      Absolute Immature Granulocytes 0.1      Absolute NRBCs 0.0       POC US ECHO LIMITED   Final Result   Limited Bedside Cardiac Ultrasound, performed and interpreted by me.     Indication: Shortness of Breath, Elevated Troponin, and Abnormal EKG.   Parasternal long axis, parasternal short axis, apical 4 chamber, and subcostal views were acquired.    Image quality was satisfactory.      Findings:     Global left ventricular function is reduced ejection but appears improved from previous with more organized cardiac contractility with decreased ventricular rate.   Chambers do not appear dilated.   IVC collapses completely with inspiration.   There is no evidence of free fluid within the pericardium.      IMPRESSION: Abnormal limited cardiac ultrasound showing reduced LV EF.  Collapsing IVC. No pericardial effusion.               CT Chest Pulmonary Embolism w Contrast   Final Result   IMPRESSION:   1.  No evidence of pulmonary embolism.   2.  Bilateral upper lobes predominant interlobular septal thickening with diffuse groundglass pulmonary opacities, can be seen with pulmonary edema or infection. Multiple small patchy nodular groundglass pulmonary opacities, could be infectious.   3.  Trace right pleural effusion.   4.  There is no opacification of the proximal aspect of the left subclavian artery, likely occluded. It appears mildly opacified distally likely reconstituting from collaterals.   5.  A few scattered pulmonary nodules including 5 mm right upper lobe subpleural nodule, as per Fleischner Society criteria, for low risk patient, no routine follow-up is recommended and for high-risk patient, optional chest CT in 12 months can be    considered.                         POC US CHEST B-SCAN (PTX/Edema/PNA)   Final Result   Limited Bedside Lung Ultrasound, performed and interpreted by me.    Indication: respiratory failure      With the patient positioned upright, bilateral anterior and lung fields were examined for evidence of pulmonary consolidation and pulmonary edema.          Findings: Bilateral apical lung fields showed B-lines.       IMPRESSION: Bilateral apical alveolar  interstitial syndrome consistent with pulmonary edema vs ARDS or multifocal pneumonia.            XR Chest Port 1 View   Final Result   IMPRESSION: Pulmonary vascularity at the upper limits of normal. Normal heart size. Interstitial opacities in the subpleural aspect of the left lower lobe concerning for edema. Diffuse interstitial and nodular alveolar infiltrates bilaterally which can    be seen with bilateral pneumonia. Aortic calcification.      POC US ECHO LIMITED   Final Result   Limited Bedside Cardiac Ultrasound, performed and interpreted by me.    Indication: Shortness of Breath and Abnormal EKG.   apical 4 chamber and subcostal views were acquired.    Image quality was satisfactory.      Findings:     Global left ventricular function appears diminished. RV hyperdynamic.   Chambers do not appear dilated.   There is no evidence of free fluid within the pericardium.      IMPRESSION: Abnormal limited cardiac ultrasound showing diminished LV ejection fraction.  No pericardial effusion.                   Critical Care Addendum  My initial assessment, based on my review of prehospital provider report, review of nursing observations, review of vital signs, focused history, physical exam, review of cardiac rhythm monitor, 12 lead ECG analysis, and discussion with Pediatric ED staff , established a high suspicion that Royal Adriano Duckworth has sepsis with indication for early goal-directed therapy, respiratory distress, and a critical arrhythmia, which requires immediate intervention, and therefore he is critically ill.     After the initial assessment, the care team initiated multiple lab tests, initiated IV fluid administration, initiated medication therapy with diltiazem, nitroglycerin, tamiflu, lasix, methylpred, and initiated intensive non-invasive respiratory support to provide stabilization care. Due to the critical nature of this patient, I reassessed nursing observations, vital signs, physical exam, review of  cardiac rhythm monitor, 12 lead ECG analysis, mental status, neurologic status, and respiratory status multiple times prior to his disposition.     Time also spent performing documentation, reviewing test results, discussion with consultants, and coordination of care.     Critical care time (excluding teaching time and procedures): 98 minutes.       Assessment & Plan    Royal Adriano Duckworth is a 74 year old male with no significant past medical history who presents to the emergency department with worsening shortness of breath over the past day.     Ddx: Atrial fibrillation with RVR, flash pulmonary edema, CHF exacerbation, COPD exacerbation, pneumonia, aspiration, alcohol withdrawal syndrome, sepsis, pulmonary embolism, multifocal pneumonia, ARDS, acute coronary syndrome, acute hypoxic and hypercapnic respiratory failure    Reviewed EKG obtained by the pediatric emergency department prior to transfer.  It looks like this showed a left bundle branch block and atrial fibrillation with RVR.  On ultrasound patient had bilateral B-lines concerning for pulmonary edema.  Portable chest x-ray also concerning for significant pulmonary edema.  Given the rapid onset of symptoms and lack of infectious symptoms or chest pain my #1 clinical suspicion is for acute CHF exacerbation potentially triggered by atrial fibrillation with RVR.  Management is challenging without previous medical history.  Patient did have wheezing on examination and was given duo nebulizations without notable improvement in symptoms.  Ordered IV magnesium and methylprednisolone.  There were significant issues with pharmacy sending down the IV magnesium for acute respiratory distress. I spoke with the pharmacist multiple times but the med was delayed a few hours and at the time that it was finally approved, I did not think it would contribute significantly to the patient's management.  Patient given IV Lasix 40 mg.  Sublingual nitroglycerin, 20 mg IV  diltiazem.  Heart rate improved from the 160s over 140s with diltiazem.  Blood pressure came down to the 120s systolic.  Patient did not experience any symptomatic improvement.  Initial troponin 17.  Therefore less likely ACS as the primary etiology for presentation. Spoke with the [facility] staff on the phone who recommended giving an additional dose of IV Lasix.  She felt it would be okay to start the patient on a diltiazem drip since he did not have cardiomegaly on chest x-ray.      The patient's presentation with hypertension, restlessness, diaphoresis, tachycardia, I questioned whether an alcohol withdrawal syndrome could be contributing.  He was given 1 mg Ativan IV with improvement of his heart rate down to the 120s but dropping pressure to the 90s.  Maps remained in the 70s.  Blood pressures remained on the lower side so discontinued the diltiazem infusion.    CT showed no pulmonary embolism.  There were bilateral upper lobe predominant interlobular septal thickening with diffuse groundglass pulmonary opacities which could be seen with pulmonary edema or infection.  Trace right pleural effusion.  Procalcitonin negative.  BNP elevated to 1695.  Lactate 4.1.  Covered empirically for sepsis/pneumonia with vancomycin, Zosyn, azithromycin.  Viral study came back positive for influenza A.  Given Tamiflu.  Repeat temperature check showed patient had a fever to 100.5. repeat point-of-care ultrasound showed a collapsing IVC and organized cardiac contractility with spontaneous conversion to sinus rhythm.  Given 250 cc of IV fluid with improvement of heart rate down to the 110s. given an additional 250 cc of fluid.  Given Tylenol for the fever.      Reassessment, patient now much more calm and breathing comfortably, able to rest on the BiPAP.  Much less restless.  In light of the new data and response to treatment I think patient's primary issue is septic pneumonia and influenza.  He spontaneously converted to sinus  tachycardia during the course of treatment.  The troponin monique slightly on recheck which I suspect is secondary to demand in the setting of significant tachycardia.  Admitted to the [facility] ICU for ongoing management.      I have reviewed the nursing notes. I have reviewed the findings, diagnosis, plan and need for follow up with the patient.    There are no discharge medications for this patient.      Final diagnoses:   Acute hypoxemic respiratory failure (H)       Noemy Jeff MD  [facility] EMERGENCY DEPARTMENT  12/26/2024        Noemy Jeff MD  12/30/24 2021

## 2024-12-27 ENCOUNTER — APPOINTMENT (OUTPATIENT)
Dept: CT IMAGING | Facility: CLINIC | Age: 74
DRG: 193 | End: 2024-12-27
Attending: STUDENT IN AN ORGANIZED HEALTH CARE EDUCATION/TRAINING PROGRAM
Payer: COMMERCIAL

## 2024-12-27 VITALS
DIASTOLIC BLOOD PRESSURE: 68 MMHG | HEART RATE: 96 BPM | TEMPERATURE: 98.2 F | OXYGEN SATURATION: 100 % | RESPIRATION RATE: 17 BRPM | SYSTOLIC BLOOD PRESSURE: 126 MMHG | WEIGHT: 184.75 LBS | HEIGHT: 71 IN | BODY MASS INDEX: 25.86 KG/M2

## 2024-12-27 LAB
ANION GAP SERPL CALCULATED.3IONS-SCNC: 9 MMOL/L (ref 7–15)
BUN SERPL-MCNC: 15 MG/DL (ref 8–23)
CALCIUM SERPL-MCNC: 8.2 MG/DL (ref 8.8–10.4)
CHLORIDE SERPL-SCNC: 102 MMOL/L (ref 98–107)
CREAT SERPL-MCNC: 1.09 MG/DL (ref 0.67–1.17)
EGFRCR SERPLBLD CKD-EPI 2021: 72 ML/MIN/1.73M2
ERYTHROCYTE [DISTWIDTH] IN BLOOD BY AUTOMATED COUNT: 13.1 % (ref 10–15)
GLUCOSE BLDC GLUCOMTR-MCNC: 103 MG/DL (ref 70–99)
GLUCOSE BLDC GLUCOMTR-MCNC: 107 MG/DL (ref 70–99)
GLUCOSE BLDC GLUCOMTR-MCNC: 97 MG/DL (ref 70–99)
GLUCOSE BLDC GLUCOMTR-MCNC: 98 MG/DL (ref 70–99)
GLUCOSE SERPL-MCNC: 103 MG/DL (ref 70–99)
HCO3 SERPL-SCNC: 24 MMOL/L (ref 22–29)
HCT VFR BLD AUTO: 35.3 % (ref 40–53)
HGB BLD-MCNC: 12.5 G/DL (ref 13.3–17.7)
MAGNESIUM SERPL-MCNC: 2.1 MG/DL (ref 1.7–2.3)
MCH RBC QN AUTO: 32.1 PG (ref 26.5–33)
MCHC RBC AUTO-ENTMCNC: 35.4 G/DL (ref 31.5–36.5)
MCV RBC AUTO: 91 FL (ref 78–100)
NT-PROBNP SERPL-MCNC: 1172 PG/ML (ref 0–900)
PHOSPHATE SERPL-MCNC: 3.3 MG/DL (ref 2.5–4.5)
PLATELET # BLD AUTO: 164 10E3/UL (ref 150–450)
POTASSIUM SERPL-SCNC: 3.7 MMOL/L (ref 3.4–5.3)
RBC # BLD AUTO: 3.9 10E6/UL (ref 4.4–5.9)
SODIUM SERPL-SCNC: 135 MMOL/L (ref 135–145)
TROPONIN T SERPL HS-MCNC: 154 NG/L
WBC # BLD AUTO: 5.3 10E3/UL (ref 4–11)

## 2024-12-27 PROCEDURE — 250N000013 HC RX MED GY IP 250 OP 250 PS 637

## 2024-12-27 PROCEDURE — 250N000013 HC RX MED GY IP 250 OP 250 PS 637: Performed by: INTERNAL MEDICINE

## 2024-12-27 PROCEDURE — 84484 ASSAY OF TROPONIN QUANT: CPT | Performed by: INTERNAL MEDICINE

## 2024-12-27 PROCEDURE — 250N000011 HC RX IP 250 OP 636: Performed by: INTERNAL MEDICINE

## 2024-12-27 PROCEDURE — 99207 PR NO BILLABLE SERVICE THIS VISIT: CPT | Performed by: INTERNAL MEDICINE

## 2024-12-27 PROCEDURE — 250N000009 HC RX 250: Performed by: INTERNAL MEDICINE

## 2024-12-27 PROCEDURE — 250N000011 HC RX IP 250 OP 636: Performed by: EMERGENCY MEDICINE

## 2024-12-27 PROCEDURE — 94640 AIRWAY INHALATION TREATMENT: CPT | Mod: 76

## 2024-12-27 PROCEDURE — 75574 CT ANGIO HRT W/3D IMAGE: CPT

## 2024-12-27 PROCEDURE — 80048 BASIC METABOLIC PNL TOTAL CA: CPT

## 2024-12-27 PROCEDURE — 36415 COLL VENOUS BLD VENIPUNCTURE: CPT

## 2024-12-27 PROCEDURE — 75574 CT ANGIO HRT W/3D IMAGE: CPT | Mod: 26 | Performed by: INTERNAL MEDICINE

## 2024-12-27 PROCEDURE — 83880 ASSAY OF NATRIURETIC PEPTIDE: CPT

## 2024-12-27 PROCEDURE — 120N000002 HC R&B MED SURG/OB UMMC

## 2024-12-27 PROCEDURE — 250N000011 HC RX IP 250 OP 636

## 2024-12-27 PROCEDURE — 250N000009 HC RX 250

## 2024-12-27 PROCEDURE — 999N000157 HC STATISTIC RCP TIME EA 10 MIN

## 2024-12-27 PROCEDURE — 84100 ASSAY OF PHOSPHORUS: CPT

## 2024-12-27 PROCEDURE — 83735 ASSAY OF MAGNESIUM: CPT

## 2024-12-27 PROCEDURE — 82435 ASSAY OF BLOOD CHLORIDE: CPT

## 2024-12-27 PROCEDURE — 99233 SBSQ HOSP IP/OBS HIGH 50: CPT

## 2024-12-27 PROCEDURE — 250N000013 HC RX MED GY IP 250 OP 250 PS 637: Performed by: STUDENT IN AN ORGANIZED HEALTH CARE EDUCATION/TRAINING PROGRAM

## 2024-12-27 PROCEDURE — 85014 HEMATOCRIT: CPT

## 2024-12-27 PROCEDURE — 258N000003 HC RX IP 258 OP 636: Performed by: INTERNAL MEDICINE

## 2024-12-27 PROCEDURE — 94640 AIRWAY INHALATION TREATMENT: CPT

## 2024-12-27 RX ORDER — METOPROLOL TARTRATE 50 MG
50 TABLET ORAL ONCE
Status: COMPLETED | OUTPATIENT
Start: 2024-12-27 | End: 2024-12-27

## 2024-12-27 RX ORDER — METOPROLOL TARTRATE 1 MG/ML
5-20 INJECTION, SOLUTION INTRAVENOUS
Status: DISCONTINUED | OUTPATIENT
Start: 2024-12-27 | End: 2024-12-27

## 2024-12-27 RX ORDER — IVABRADINE 5 MG/1
10 TABLET, FILM COATED ORAL ONCE
Status: COMPLETED | OUTPATIENT
Start: 2024-12-27 | End: 2024-12-27

## 2024-12-27 RX ORDER — CEFTRIAXONE 2 G/1
2 INJECTION, POWDER, FOR SOLUTION INTRAMUSCULAR; INTRAVENOUS EVERY 24 HOURS
Status: DISCONTINUED | OUTPATIENT
Start: 2024-12-27 | End: 2024-12-29 | Stop reason: HOSPADM

## 2024-12-27 RX ORDER — IOPAMIDOL 755 MG/ML
110 INJECTION, SOLUTION INTRAVASCULAR ONCE
Status: COMPLETED | OUTPATIENT
Start: 2024-12-27 | End: 2024-12-27

## 2024-12-27 RX ORDER — IPRATROPIUM BROMIDE AND ALBUTEROL SULFATE 2.5; .5 MG/3ML; MG/3ML
3 SOLUTION RESPIRATORY (INHALATION) EVERY 4 HOURS PRN
Status: DISCONTINUED | OUTPATIENT
Start: 2024-12-27 | End: 2024-12-29 | Stop reason: HOSPADM

## 2024-12-27 RX ORDER — METOPROLOL SUCCINATE 25 MG/1
25 TABLET, EXTENDED RELEASE ORAL ONCE
Status: COMPLETED | OUTPATIENT
Start: 2024-12-27 | End: 2024-12-27

## 2024-12-27 RX ORDER — NITROGLYCERIN 0.4 MG/1
.4-.8 TABLET SUBLINGUAL
Status: DISCONTINUED | OUTPATIENT
Start: 2024-12-27 | End: 2024-12-27

## 2024-12-27 RX ADMIN — ENOXAPARIN SODIUM 40 MG: 40 INJECTION SUBCUTANEOUS at 08:37

## 2024-12-27 RX ADMIN — METOPROLOL SUCCINATE 25 MG: 25 TABLET, EXTENDED RELEASE ORAL at 08:37

## 2024-12-27 RX ADMIN — METOPROLOL TARTRATE 50 MG: 50 TABLET, FILM COATED ORAL at 13:31

## 2024-12-27 RX ADMIN — THIAMINE HYDROCHLORIDE 200 MG: 100 INJECTION, SOLUTION INTRAMUSCULAR; INTRAVENOUS at 10:34

## 2024-12-27 RX ADMIN — CEFTRIAXONE SODIUM 2 G: 2 INJECTION, POWDER, FOR SOLUTION INTRAMUSCULAR; INTRAVENOUS at 15:25

## 2024-12-27 RX ADMIN — METOPROLOL TARTRATE 5 MG: 5 INJECTION INTRAVENOUS at 14:52

## 2024-12-27 RX ADMIN — METOPROLOL TARTRATE 5 MG: 5 INJECTION INTRAVENOUS at 14:48

## 2024-12-27 RX ADMIN — OSELTAMIVIR PHOSPHATE 75 MG: 75 CAPSULE ORAL at 20:19

## 2024-12-27 RX ADMIN — IVABRADINE 10 MG: 5 TABLET, FILM COATED ORAL at 13:31

## 2024-12-27 RX ADMIN — OSELTAMIVIR PHOSPHATE 75 MG: 75 CAPSULE ORAL at 08:37

## 2024-12-27 RX ADMIN — PIPERACILLIN SODIUM AND TAZOBACTAM SODIUM 3.38 G: 3; .375 INJECTION, SOLUTION INTRAVENOUS at 03:01

## 2024-12-27 RX ADMIN — METOPROLOL TARTRATE 5 MG: 5 INJECTION INTRAVENOUS at 14:42

## 2024-12-27 RX ADMIN — IPRATROPIUM BROMIDE AND ALBUTEROL SULFATE 3 ML: .5; 3 SOLUTION RESPIRATORY (INHALATION) at 07:47

## 2024-12-27 RX ADMIN — THIAMINE HYDROCHLORIDE 200 MG: 100 INJECTION, SOLUTION INTRAMUSCULAR; INTRAVENOUS at 20:18

## 2024-12-27 RX ADMIN — PIPERACILLIN SODIUM AND TAZOBACTAM SODIUM 3.38 G: 3; .375 INJECTION, SOLUTION INTRAVENOUS at 10:34

## 2024-12-27 RX ADMIN — NITROGLYCERIN 0.8 MG: 0.4 TABLET SUBLINGUAL at 14:53

## 2024-12-27 RX ADMIN — AZITHROMYCIN MONOHYDRATE 500 MG: 500 INJECTION, POWDER, LYOPHILIZED, FOR SOLUTION INTRAVENOUS at 06:12

## 2024-12-27 RX ADMIN — IPRATROPIUM BROMIDE AND ALBUTEROL SULFATE 3 ML: .5; 3 SOLUTION RESPIRATORY (INHALATION) at 17:34

## 2024-12-27 RX ADMIN — METOPROLOL TARTRATE 5 MG: 5 INJECTION INTRAVENOUS at 14:45

## 2024-12-27 RX ADMIN — ACETAMINOPHEN 975 MG: 325 TABLET, FILM COATED ORAL at 22:11

## 2024-12-27 RX ADMIN — IOPAMIDOL 110 ML: 755 INJECTION, SOLUTION INTRAVENOUS at 14:38

## 2024-12-27 ASSESSMENT — ACTIVITIES OF DAILY LIVING (ADL)
ADLS_ACUITY_SCORE: 35
ADLS_ACUITY_SCORE: 32
ADLS_ACUITY_SCORE: 32
ADLS_ACUITY_SCORE: 35
ADLS_ACUITY_SCORE: 32
ADLS_ACUITY_SCORE: 32
DEPENDENT_IADLS:: INDEPENDENT
ADLS_ACUITY_SCORE: 32
ADLS_ACUITY_SCORE: 35
ADLS_ACUITY_SCORE: 35
ADLS_ACUITY_SCORE: 32
ADLS_ACUITY_SCORE: 35
ADLS_ACUITY_SCORE: 32
ADLS_ACUITY_SCORE: 32
ADLS_ACUITY_SCORE: 35
ADLS_ACUITY_SCORE: 32

## 2024-12-27 NOTE — PROGRESS NOTES
Hospital Medicine Cross Cover Note    December 27, 2024 3:49 AM    The patient arrived from  ICU to [facility] for further cardiac work up.     Initially admitted to  ICU on 12/26 with hypoxic respiratory failure in the setting of influenza requiring BIPAP.   BIPAP has since been weaned off to NC. He is on tamiflu, zosyn and vnacomycin. The hospital course was complicated by SVT, elevated troponin and LBBB. TTE showed LVEF 35-40% and base/mid anteroseptal hypokinesis and per ICU note, cardiology recommended Coronary CTA and starting metoprolol 25mg (goal around 60bpm for the study).    Action taken:   -reviewed current orders: Cardiology consult and CT angiogram heart order are already in place  - HR currently 96: likely need additional metoprolol prior to cardiac CT      Linda Davis MD on 12/27/2024 at 3:49 AM

## 2024-12-27 NOTE — PROGRESS NOTES
Antimicrobial Stewardship Team Note    Antimicrobial Stewardship Program - A joint venture between [facility] and  Physicians to optimize antibiotic management.  NOT a formal consult - Restricted Antimicrobial Review     Patient: Royal Adriano Duckworth  MRN: [id number]  Allergies: Patient has no known allergies.    Brief Summary: Royal Adriano Duckworth is a 74 year old male who does not frequently doctor and does not have any known past medical history. He does have a notable smoking history with 1ppd currently.  He had been having shortness of breath since the day prior to presentation, but was progressively worsening so present to the ED the early morning of 12/26.  He was admitted to [facility] ICU for management of respiratory failure, then tx to [facility] for cardiac monitoring.  He was found to be influenza A positive, he is unvaccinated per [facility] records for influenza (or COVID) and was started on antiviral (oseltamivir) and broad spectrum, empiric antibiotic treatment (zosyn, azithromycin and vanco)      HPI:  Upon arrival to the ED, he was in acute distress and toxic-appearing with O2 saturations into the 40s. His pH was 7.14 with a CO2 of 64, tachycardia and wheezing. EKG reveals atrial fibrillation with SVT, LBBB elevated troponin, TTE showed LVEF 35-40% -per cardiology likely demand ischemia from respiratory failure. He was treated with multiple duonebs and BiPAP with improvement in pH to 7.28. He has remained afebrile, WBC wnl, procal low at 0.07,  LA 4.2 (>1.8)tachycardia and tachypnea.  12/26 CT chest: Bilateral upper lobes predominant interlobular septal thickening with diffuse groundglass pulmonary opacities, can be seen with pulmonary edema or infection. Multiple small patchy nodular groundglass pulmonary opacities, could be infectious.   Influenza nasal swab and RVP was positive for influenza A, negative for other viral infections.  Blood cultures are negative to date and nares PCR is  negative for MRSA colonizaiton.  Since admission, he has now stabilized with being weaned off BiPAP on 1L NC, tachycardia resolved, LA improved (1.8)  and remains afebrile.  He remains on broad spectrum antibiotics with zosyn and azithromycin, vanco x1 on 12/26, then stopped.  Oseltamivir started on 12/26.       Active Anti-infective Medications   (From admission, onward)                 Start     Stop    12/27/24 0530  azithromycin (ZITHROMAX) 500 mg vial mixture ingredient  500 mg,   Intravenous,   EVERY 24 HOURS        Community Acquired Pneumonia       12/29/24 0529    12/27/24 0400  piperacillin-tazobactam  3.375 g,   Intravenous,   100 mL/hr,   EVERY 6 HOURS        Community Acquired Pneumonia       --    12/26/24 2000  oseltamivir  75 mg,   Oral,   2 TIMES DAILY        Influenza       --                  Assessment: Severe influenza A-respiratory failure in unvaccinated, high risk individual.  Patient is an active smoker and elderly (75 yo) but with unclear other comorbid conditions given his lack of previous medical care.  His presentation is consistent with severe Influenza A complicated by cardiac abnormalities.  CT of chest does have some findings that could be consistent with viral pneumonia but no clear infiltrates or consolidations on imaging to suggest a super-imposed bacterial process.  In addition, given his rapid presentation (noted to be within 24hrs of feeling ill) he will likely benefit from early initiation of oseltamivir and superimposed bacterial pneumonia this early is less likely.  In the absence of elevated WBC or fevers and a negative procalcitonin, would target viral process with oseltamivir-as you are doing.  Ongoing broad spectrum antibiotics should be re-assess today.  He does not have risk factors for Pseudomonas or other resistant gram negatives.  He does have some risk factors for CAP where atypical coverage may be appropriate-but again low suspicion for acute bacterial process.  QTc have been in the 500s and azithromycin can cause QTc elevation and oseltamivir can cause cardiac dysthymias therefore close monitoring is recommended with concomitant medications.    Recommendations:  Continue oseltamavir and plan to treat for 5 days (please add stop date in Epic 12/30)  Recommend stopping zosyn today and observe for any new s/s of acute bacterial process  Given his smoking history, if concern for possible bacterial pneumonia (CAP, could complete 3 days of azithromycin.  Recommend changing this to oral route (500 mg) to complete the course.   Patient may be a good candidate to address vaccinations given his advanced age.  If primary care is established at discharge, they could discuss benefits of vaccinations such as COVID, influenza, pneumococcal etc)    Medication Change: discontinue one or more antibiotic(s), change from IV to PO antibiotic(s), de-escalate antibiotic regimen -  .  Other Recommendation(s): Other (comment) (eligible for vaccinations) -      Pharmacy took the following actions: Sticky note reminder created, Called/paged provider, Electronic note created.    Discussed with ID Staff Dr. Dion Sylvester, PharmD, MPH, [facility]  Vocera available    Vital Signs/Clinical Features:  Vitals         12/25 0700  12/26 0659 12/26 0700  12/27 0659 12/27 0700  12/27 1340   Most Recent      Temp ( F) 98.9 -  100.5    97.8 -  98.2       97.9 (36.6) 12/27 0613    Pulse 95 -  162    75 -  100    80 -  89     80 12/27 1332    Resp 32 -  39    13 -  21       16 12/27 0613    BP 88/52 -  156/91    100/64 -  132/66    116/61 -  130/67     130/67 12/27 1332    SpO2 (%) 46 -  97    87 -  100    92 -  96     92 12/27 1332            Labs  Estimated Creatinine Clearance: 70.4 mL/min (based on SCr of 1.09 mg/dL).  Recent Labs   Lab Test 12/26/24  0150 12/26/24  0446 12/26/24  1559 12/27/24  0551   CR 1.13 1.08 0.95 1.09       Recent Labs   Lab Test 12/26/24  0150 12/26/24  0203 12/27/24  0551    WBC 7.9  --  5.3   HGB 15.9 15.3 12.5*   HCT 47.7 45 35.3*   MCV 95  --  91     --  164       Recent Labs   Lab Test 12/26/24  0150   BILITOTAL 0.3   ALKPHOS 105   ALBUMIN 4.1   AST 28   ALT 24       Recent Labs   Lab Test 12/26/24  0150 12/26/24  0203 12/26/24  0215 12/26/24  0446   PCAL 0.07  --   --   --    LACT  --  4.1* 4.3* 1.8   CRPI 31.99*  --   --   --              Culture Results:  7-Day Micro Results       Procedure Component Value Units Date/Time    MRSA MSSA PCR, Nasal Swab [[id number]] Collected: 12/26/24 0805    Order Status: Completed Lab Status: Final result Updated: 12/26/24 1232    Specimen: Swab from Nare, Left      MRSA Target DNA Negative     SA Target DNA Negative    Narrative:      The [email or web address]  Xpert SA Nasal Complete assay performed in the [employer]  Dx System is a qualitative in vitro diagnostic test designed for rapid detection of Staphylococcus aureus (SA) and methicillin-resistant Staphylococcus aureus (MRSA) from nasal swabs in patients at risk for nasal colonization. The test utilizes automated real-time polymerase chain reaction (PCR) to detect MRSA/SA DNA. The Xpert SA Nasal Complete assay is intended to aid in the prevention and control of MRSA/SA infections in healthcare settings. The assay is not intended to diagnose, guide or monitor treatment for MRSA/SA infections, or provide results of susceptibility to methicillin. A negative result does not preclude MRSA/SA nasal colonization.     Blood Culture Peripheral Blood [[id number]]  (Normal) Collected: 12/26/24 0342    Order Status: Completed Lab Status: Preliminary result Updated: 12/27/24 0646    Specimen: Peripheral Blood      Culture No growth after 1 day    Blood Culture Arm, Right [[id number]]  (Normal) Collected: 12/26/24 0254    Order Status: Completed Lab Status: Preliminary result Updated: 12/27/24 0416    Specimen: Blood from Arm, Right      Culture No growth after 1 day    Respiratory Panel PCR  [[id number]]  (Abnormal) Collected: 12/26/24 0241    Order Status: Completed Lab Status: Final result Updated: 12/26/24 0830    Specimen: Swab from Nasopharyngeal      Adenovirus Not Detected     Coronavirus Not Detected     Comment: This test detects Coronavirus 229E, HKU1, NL63 and OC43 but does not distinguish between them. It does not detect MERS ( Respiratory Syndrome), SARS (Severe Acute Respiratory Syndrome) or 2019-nCoV (Novel 2019) Coronavirus.        Human Metapneumovirus Not Detected     Human Rhin/Enterovirus Not Detected     Influenza A Detected     Influenza A, H1 Not Detected     Influenza A 2009 H1N1 Detected     Influenza A, H3 Not Detected     Influenza B Not Detected     Parainfluenza Virus 1 Not Detected     Parainfluenza Virus 2 Not Detected     Parainfluenza Virus 3 Not Detected     Parainfluenza Virus 4 Not Detected     Respiratory Syncytial Virus A Not Detected     Respiratory Syncytial Virus B Not Detected     Chlamydia Pneumoniae Not Detected     Mycoplasma Pneumoniae Not Detected    Narrative:      The [employer] Respiratory Panel is a qualitative nucleic acid, multiplex, in vitro diagnostic test for the simultaneous detection and identification of multiple respiratory viral and bacterial nucleic acids in nasopharyngeal swabs collected in viral transport media from individual exhibiting signs and symptoms of respiratory infection. The assay has received FDA approval for the testing of nasopharyngeal (NP) swabs only. This test is used for clinical purposes and should not be regarded as investigational or for research. This laboratory is certified under the Clinical Laboratory Improvement Amendments of 1988 (CLIA-88) as qualified to perform high complexity clinical laboratory testing.            Recent Labs   Lab Test 12/26/24  0448   URINEPH 5.5   NITRITE Negative   LEUKEST Negative   WBCU <1             Recent Labs   Lab Test 12/26/24  0241   IFLUA Detected*   FLUAH1 Not  Detected   FLUAH3 Not Detected   [id number] Detected*   IFLUB Not Detected   RSVA Not Detected   RSVB Not Detected   PIV1 Not Detected   PIV2 Not Detected   PIV3 Not Detected   HMPV Not Detected             Imaging: Echo Complete    Result Date: 2024  [id number]^JUNO  [facility] [address] [place] [id number]  Name: ROYAL KALEN BARRIOS MRN: [id number] : 1950 Study Date: 2024 01:38 PM Age: 74 yrs Gender: Male Patient Location: [facility] Reason For Study: Respiratory Failure Ordering Physician: JUNO VEGAS Performed By: Pato Queen RDCS  BSA: 2.0 m2 Height: 71 in Weight: 184 lb HR: 83 BP: 109/59 mmHg ______________________________________________________________________________ Procedure Echocardiogram with two-dimensional, color and spectral Doppler. Contrast Definity. Technically difficult study.Extremely poor acoustic windows. Definity (NDC #38287-656-75) given intravenously. Patient was given 7ml mixture of 1.5ml Definity and 8.5ml saline. 3 ml wasted. ______________________________________________________________________________ Interpretation Summary Technically difficult study.Extremely poor acoustic windows. Left ventricular function is decreased. The ejection fraction is 35-40% (moderately reduced). Base and mid anteroseptal hypokinesis Global right ventricular function is normal. IVC diameter <2.1 cm collapsing >50% with sniff suggests a normal RA pressure of 3 mmHg. No pericardial effusion is present. There is no prior study for direct comparison. ______________________________________________________________________________ Left Ventricle Left ventricular size is normal. Left ventricular function is decreased. The ejection fraction is 35-40% (moderately reduced). Left ventricular diastolic function is abnormal. Base and mid anteroseptal hypokinesis.  Right Ventricle The right  ventricle is normal size. Global right ventricular function is normal.  Aortic Valve The valve leaflets are not well visualized.  Tricuspid Valve The valve leaflets are not well visualized. On Doppler interrogation, there is no significant stenosis or regurgitation. The peak velocity of the tricuspid regurgitant jet is not obtainable. Pulmonary artery systolic pressure cannot be assessed.  Pulmonic Valve The valve leaflets are not well visualized.  Vessels IVC diameter <2.1 cm collapsing >50% with sniff suggests a normal RA pressure of 3 mmHg.  Pericardium No pericardial effusion is present.  Compared to Previous Study There is no prior study for direct comparison.  ______________________________________________________________________________ MMode/2D Measurements & Calculations LA Volume (BP): 21.1 ml  LA Volume Index (BP): 10.3 ml/m2 TAPSE: 2.4 cm  Doppler Measurements & Calculations MV E max lawrence: 83.1 cm/sec MV A max lawrence: 139.0 cm/sec MV E/A: 0.60 MV dec slope: 445.0 cm/sec2 MV dec time: 0.19 sec E/E' av.8 Lateral E/e': 8.6 Medial E/e': 6.9 RV S Lawrence: 12.5 cm/sec  ______________________________________________________________________________ Report approved by: India Nunez Dr on 2024 03:40 PM       CT Chest Pulmonary Embolism w Contrast    Result Date: 2024  EXAM: CT CHEST PULMONARY EMBOLISM W CONTRAST LOCATION: [facility] DATE: 2024 INDICATION: Acute resp fail COMPARISON: Chest x-ray on 2024 TECHNIQUE: CT chest pulmonary angiogram during arterial phase injection of IV contrast. Multiplanar reformats and MIP reconstructions were performed. Dose reduction techniques were used. CONTRAST: 64mL Isovue 370 FINDINGS: ANGIOGRAM CHEST: No evidence of pulmonary embolism. No evidence of thoracic aortic aneurysm or dissection. Extensive atherosclerotic vascular calcification of the thoracic aorta. There is no opacification of the  proximal aspect of the left subclavian artery which likely reconstituting distally from collaterals. No evidence for right heart strain. LUNGS AND PLEURA: Trace right pleural effusion. No significant left pleural effusion. No significant pneumothorax. Upper lobes predominant emphysematous changes. Mild upper lobes predominant interlobular septal thickening and diffuse groundglass pulmonary opacities, can be seen with pulmonary edema or infection. Scattered patchy nodular pulmonary opacities most prominent in the right upper lobe, likely infectious. Few scattered pulmonary nodules including 5 mm right upper lobe subpleural nodule (series 6 image 104). MEDIASTINUM/AXILLAE: No cardiomegaly or significant pericardial effusion. Multiple prominent mediastinal and hilar lymph nodes, indeterminate, could be reactive. CORONARY ARTERY CALCIFICATION: Moderate. UPPER ABDOMEN: Limited evaluation of the upper abdomen due to lack of coverage. MUSCULOSKELETAL: Multilevel degenerative changes of the spine. No suspicious osseous lesion.     IMPRESSION: 1.  No evidence of pulmonary embolism. 2.  Bilateral upper lobes predominant interlobular septal thickening with diffuse groundglass pulmonary opacities, can be seen with pulmonary edema or infection. Multiple small patchy nodular groundglass pulmonary opacities, could be infectious. 3.  Trace right pleural effusion. 4.  There is no opacification of the proximal aspect of the left subclavian artery, likely occluded. It appears mildly opacified distally likely reconstituting from collaterals. 5.  A few scattered pulmonary nodules including 5 mm right upper lobe subpleural nodule, as per Fleischner Society criteria, for low risk patient, no routine follow-up is recommended and for high-risk patient, optional chest CT in 12 months can be considered.        XR Chest Port 1 View    Result Date: 12/26/2024  EXAM: XR CHEST PORT 1 VIEW LOCATION: [facility]  MEDICAL CENTER DATE: 12/26/2024 INDICATION: Dyspnea with hypoxia. COMPARISON: None.     IMPRESSION: Pulmonary vascularity at the upper limits of normal. Normal heart size. Interstitial opacities in the subpleural aspect of the left lower lobe concerning for edema. Diffuse interstitial and nodular alveolar infiltrates bilaterally which can be seen with bilateral pneumonia. Aortic calcification.

## 2024-12-27 NOTE — PROGRESS NOTES
Brief Cardiology Consult Note   12/27/2024    Royal Adriano Duckworth MRN# [id number]   Age: 74 year old YOB: 1950     Patient found to have LVEF 35-40% with base and mid anteroseptal hypokinesis on echo. He was transferred to [facility] and CTA coronary showed mild nonobstructive disease. His moderately reduced EF could be secondary to stress in the setting of profound hypoxemia on arrival, tachycardia, and pneumonia. We expect there may be recovery without development of clinical heart failure. He also has risk factors for cardiomyopathy given history of cocaine use and alcohol intake. It would be helpful for patient to be seen in cardiology clinic on discharge (referral order placed for you already). Recommend discharging on metoprolol succinate 25 mg and losartan 25 mg daily, which can likely be discontinued if repeat echo shows recovery.     We will now sign off, but please reach out with any questions/concerns.    Vincent Love MD  Cardiology Fellow, PGY-4  Pager: [phone]

## 2024-12-27 NOTE — CONSULTS
Care Management Initial Consult    General Information  Assessment completed with: Patient,    Type of CM/SW Visit: Initial Assessment    Primary Care Provider verified and updated as needed: No (Denied wanting assistance establishing care with a PCP at this time)   Readmission within the last 30 days: no previous admission in last 30 days      Reason for Consult: other (see comments) (SDOH needs)  Advance Care Planning: Advance Care Planning Reviewed: no concerns identified          Communication Assessment  Patient's communication style: spoken language (English or Bilingual)    Hearing Difficulty or Deaf: no   Wear Glasses or Blind: yes    Cognitive  Cognitive/Neuro/Behavioral: WDL                      Living Environment:   People in home: spouse  WIfe - Dorina  Current living Arrangements: house      Able to return to prior arrangements: yes       Family/Social Support:  Care provided by: self  Provides care for: no one  Marital Status:   Support system: Wife          Description of Support System: Supportive, Involved    Support Assessment: Adequate family and caregiver support    Current Resources:   Patient receiving home care services: No        Community Resources: None  Equipment currently used at home: none  Supplies currently used at home: None    Employment/Financial:  Employment Status:          Financial Concerns: none   Referral to Financial Worker: No       Does the patient's insurance plan have a 3 day qualifying hospital stay waiver?  No    Lifestyle & Psychosocial Needs:  Social Drivers of Health     Food Insecurity: Low Risk  (12/26/2024)    Food Insecurity     Within the past 12 months, did you worry that your food would run out before you got money to buy more?: No     Within the past 12 months, did the food you bought just not last and you didn t have money to get more?: No   Depression: Not at risk (5/31/2024)    PHQ-2     PHQ-2 Score: 0   Housing Stability: High Risk (12/26/2024)     "Housing Stability     Do you have housing? : No     Are you worried about losing your housing?: No   Tobacco Use: High Risk (12/26/2024)    Patient History     Smoking Tobacco Use: Every Day     Smokeless Tobacco Use: Never     Passive Exposure: Not on file   Financial Resource Strain: Low Risk  (12/26/2024)    Financial Resource Strain     Within the past 12 months, have you or your family members you live with been unable to get utilities (heat, electricity) when it was really needed?: No   Alcohol Use: Not on file   Transportation Needs: Low Risk  (12/26/2024)    Transportation Needs     Within the past 12 months, has lack of transportation kept you from medical appointments, getting your medicines, non-medical meetings or appointments, work, or from getting things that you need?: No   Physical Activity: Not on file   Interpersonal Safety: Low Risk  (12/26/2024)    Interpersonal Safety     Do you feel physically and emotionally safe where you currently live?: Yes     Within the past 12 months, have you been hit, slapped, kicked or otherwise physically hurt by someone?: No     Within the past 12 months, have you been humiliated or emotionally abused in other ways by your partner or ex-partner?: No   Stress: Not on file   Social Connections: Not on file   Health Literacy: Not on file       Functional Status:  Prior to admission patient needed assistance:   Dependent ADLs:: Independent  Dependent IADLs:: Independent       Mental Health Status:  Mental Health Status: No Current Concerns       Chemical Dependency Status:  Chemical Dependency Status: No Current Concerns             Values/Beliefs:  Spiritual, Cultural Beliefs, Orthodoxy Practices, Values that affect care: no               Discussed  Partnership in Safe Discharge Planning  document with patient/family: No    Additional Information:  \"Royal Adriano Duckworth is a 74 year old male who does not frequently doctor and does not have any known past medical history. " "The patient reports that he had been having shortness of breath for about the last day. He then decided to present to the ED the early morning of 12/26 via private vehicle. On arrival, the patient was found to have O2 saturations into the 40s. His pH was 7.14 with a CO2 of 64. He was treated with multiple duonebs and BiPAP with improvement in pH to 7.28. The patient was admitted to the ICU for ongoing management of hypoxic and hypercapnic respiratory failure.\"    Care management consult was placed for SDOH needs - housing concerns \"Do you have housing? No\"  SW met with the patient at bedside to complete initial care management assessment. SW introduced self and explained their role in the patient's care. SW verified the patient's address and insurance. Patient does not have a PCP on file. Patient stated he is not interested in establishing care with a PCP at this time stating \"if it's not broke don't fix it\"    Patient resides in a house with his wife Dorina. Patient reported he is independent with all ADLs and IADLs. He denies using any DME or receiving any in home services. Patient reported he is retired, he managed a local bar for 40 years and he currently receiving social security. Patient denied having any financial or housing concerns at this time. Patient reported he drove him self to the [facility] ED and plans to drive himself home. SW informed patient there is a shuttle that goes from [place] to [facility] so patient can take the shuttle to get his care upon discharge. He stated understanding. Patient asked when he will be able to discharge. SW stated they are unsure and will check with the provider. Patient denied having any questions or concerns at this time.    @[phone] ACE messaged primary medicine provider Dr. Miguel Le asking for them to speak with the patient when they have the time.  Provider replied back stating they will speak with the patient.   ACE asked Dr. Le if prior to discharge the patient " will be weaned off of oxygen or if the patient will discharge with new oxygen.   Bedside nurse reported she will try to wean patient off of oxygen. Bedside nurse informed SW that the patient is currently on IV abx.     Next Steps: Care management will continue to follow for discharge needs.     Desean Santana, [facility]  [address]   Office: [phone]  [email or web address]  Securely message with Nets[employer] (Search Name or 7C Med Surg [id number] )  Text page via [facility] Paging/Directory   See signed in provider for up to date coverage information  Social Work & Care Management Department

## 2024-12-27 NOTE — PLAN OF CARE
Goal Outcome Evaluation:    Outcome Evaluation: Patient arrived to unit at around 0000. IND/SBA in room. On 1L NC for comfort. VSS. No c/o pain. Oriented x4, pleasant. Regular diet. Here for cardiac monitoring, on tele. Voided with urinal, adequate. Skin intact.     Order for Q4 BG, writer clarified order with noc provider, deferred to dayteam. BG 97, morning check pt refused. CPOC.

## 2024-12-27 NOTE — PROGRESS NOTES
[facility]    Medicine Progress Note - Hospitalist Service, GOLD TEAM 8    Date of Admission:  12/26/2024    Assessment & Plan   Royal Adriano Duckworth is a 74 year old male who does not doctor frequently and has no known past medical history, who was admitted to ICU on 12/26/2024 for shortness of breath and acute hypoxic/hypercapnic respiratory failure initially requiring BiPAP 2/2 H1N1 virus/CAP, also noted to have elevated troponin  concerning for ACS vs demand ischemia.  Subsequently transferred to [place] given depressed ejection fraction.     Updates today  -Hypoxia has resolved.  -CTA coronary  -Cardiology consulted, recs appreciated  -DC Zosyn, changed to ceftriaxone      # Acute hypoxic, hypercapnic respiratory failure  # Influenza A, H1N1  # Community-acquired Pneumonia  Reported SOB x1 day,found to have O2 sats 40s. Initial VBG pH 7.14/CO2 of 64. Received multiple duonebs, started on BiPAP, broad spectrum antibiotics and Tamiflu initiated. CT PE negative for acute PE, diffuse GGO to bilateral upper lobes, scattered patchy pulmonary opacities, likely infectious. Was put  on BiPAP.  Was put on inhalers, Zosyn and azithromycin with subsequent improvement of hypoxia.  - Weaned to room air  - SpO2 goal >90%  - Continue ceftriaxone plus azithromycin  - Continue Tamiflu  - Encourage cough & deep breathing  - Duonebs TID  - Follow cultures  - Would likely benefit from PFTs outpatient as CT noted upper lobe emphysematous changes       # SVT at admission  # Left bundle branch block  # Elevated troponin  # HFrEF  #History of cocaine use  #History of alcohol use  No known prior cardiac history.  Chest heaviness and admission for hypoxic respiratory failure as above.Iinitial troponin 16-->61-->196-->peaked at 198. Consider demand ischemia 2/2 acute hypoxic respiratory failure vs ACS given unknown cardiac hx. BNP 1695, received 80 mg initially.  TTE with EF of 35 to 40%  "regional wall motion abnormality.  Differential include critical illness cardiomyopathy, alcohol use, ischemic heart disease cocaine use  etc. Currently euvolemic.  - Serial Troponin, EKGs Q4H  - Cardiology consulted, appreciate recs  - CTA coronary           Diet: Regular Diet Adult    DVT Prophylaxis: Enoxaparin (Lovenox) SQ  Davies Catheter: Not present  Lines: None     Cardiac Monitoring: ACTIVE order. Indication: ICU  Code Status: Full Code      Clinically Significant Risk Factors         # Hyponatremia: Lowest Na = 132 mmol/L in last 2 days, will monitor as appropriate  # Hypochloremia: Lowest Cl = 95 mmol/L in last 2 days, will monitor as appropriate  # Hypocalcemia: Lowest Ca = 8 mg/dL in last 2 days, will monitor and replace as appropriate    # Anion Gap Metabolic Acidosis: Highest Anion Gap = 22 mmol/L in last 2 days, will monitor and treat as appropriate       # Acute heart failure with reduced ejection fraction: last echo with EF <40% and receiving IV diuretics    # Acute Hypercapnic Respiratory Failure: based on venous blood gas results.  Continue supplemental oxygen and ventilatory support as indicated.         # Overweight: Estimated body mass index is 25.36 kg/m  as calculated from the following:    Height as of this encounter: 1.803 m (5' 11\").    Weight as of this encounter: 82.5 kg (181 lb 12.8 oz)., PRESENT ON ADMISSION     # Financial/Environmental Concerns: none         Social Drivers of Health    Housing Stability: High Risk (12/26/2024)    Housing Stability     Do you have housing? : No     Are you worried about losing your housing?: No   Tobacco Use: High Risk (12/26/2024)    Patient History     Smoking Tobacco Use: Every Day     Smokeless Tobacco Use: Never          Disposition Plan     Medically Ready for Discharge: Anticipated Tomorrow             Miguel Le MD  Hospitalist Service, GOLD TEAM 8  [facility]  Securely message with [employer] (more " info)  Text page via [facility] Paging/Directory   See signed in provider for up to date coverage information  ______________________________________________________________________    Interval History   Patient feeling better and eager to go home.  No chest pain.  No shortness of breath.    Physical Exam   Vital Signs: Temp: 97.9  F (36.6  C) Temp src: Oral BP: 130/67 Pulse: 77   Resp: 16 SpO2: 92 % O2 Device: None (Room air) Oxygen Delivery: 1 LPM  Weight: 181 lbs 12.8 oz    Constitutional: Not in acute disteress   Eyes: No scleral icterus. PERRLA. EOMI  Respiratory: Breathing comfortably on room air. CTAB. No accessory muscle use. No crackles, wheezes, rhonchi or rales.  Cardiovascular: No JVD, RRR. No murmurs, rubs or gallops.  GI: Soft, nontender, nondistended. No guarding or rebound tenderness.   Skin: No rash. No ecchymoses or petechiae.  Musculoskeletal: Normal muscle bulk and tone. Extremities warm and well perfused. No edema  Neurologic: AOx3.       Medical Decision Making       55 MINUTES SPENT BY ME on the date of service doing chart review, history, exam, documentation & further activities per the note.      Data     I have personally reviewed the following data over the past 24 hrs:    5.3  \   12.5 (L)   / 164     135 102 15.0 /  103 (H)   3.7 24 1.09 \     Trop: 154 (HH) BNP: N/A       Imaging results reviewed over the past 24 hrs:   Recent Results (from the past 24 hours)   Echo Complete   Result Value    LVEF  35-40% (moderately reduced)    Narrative    [id number]  [facility]  [id number]  [id number]^SHASTA^[facility],[place]  Echocardiography Laboratory  [address] [place] [id number]     Name: ROYAL KALEN BARRIOS  MRN: [id number]  : 1950  Study Date: 2024 01:38 PM  Age: 74 yrs  Gender: Male  Patient Location: [facility]  Reason For Study: Respiratory Failure  Ordering Physician: JUNO VEGAS  Performed By: Pato Queen RDCS     BSA: 2.0 m2  Height:  71 in  Weight: 184 lb  HR: 83  BP: 109/59 mmHg  ______________________________________________________________________________  Procedure  Echocardiogram with two-dimensional, color and spectral Doppler. Contrast  Definity. Technically difficult study.Extremely poor acoustic windows.  Definity (NDC #18389-961-59) given intravenously. Patient was given 7ml  mixture of 1.5ml Definity and 8.5ml saline. 3 ml wasted.  ______________________________________________________________________________  Interpretation Summary  Technically difficult study.Extremely poor acoustic windows.  Left ventricular function is decreased. The ejection fraction is 35-40%  (moderately reduced). Base and mid anteroseptal hypokinesis  Global right ventricular function is normal.  IVC diameter <2.1 cm collapsing >50% with sniff suggests a normal RA pressure  of 3 mmHg.  No pericardial effusion is present.  There is no prior study for direct comparison.  ______________________________________________________________________________  Left Ventricle  Left ventricular size is normal. Left ventricular function is decreased. The  ejection fraction is 35-40% (moderately reduced). Left ventricular diastolic  function is abnormal. Base and mid anteroseptal hypokinesis.     Right Ventricle  The right ventricle is normal size. Global right ventricular function is  normal.     Aortic Valve  The valve leaflets are not well visualized.     Tricuspid Valve  The valve leaflets are not well visualized. On Doppler interrogation, there is  no significant stenosis or regurgitation. The peak velocity of the tricuspid  regurgitant jet is not obtainable. Pulmonary artery systolic pressure cannot  be assessed.     Pulmonic Valve  The valve leaflets are not well visualized.     Vessels  IVC diameter <2.1 cm collapsing >50% with sniff suggests a normal RA pressure  of 3 mmHg.     Pericardium  No pericardial effusion is present.     Compared to Previous Study  There is  no prior study for direct comparison.     ______________________________________________________________________________  MMode/2D Measurements & Calculations  LA Volume (BP): 21.1 ml     LA Volume Index (BP): 10.3 ml/m2  TAPSE: 2.4 cm     Doppler Measurements & Calculations  MV E max lawrence: 83.1 cm/sec  MV A max lawrence: 139.0 cm/sec  MV E/A: 0.60  MV dec slope: 445.0 cm/sec2  MV dec time: 0.19 sec  E/E' av.8  Lateral E/e': 8.6  Medial E/e': 6.9  RV S Lawrence: 12.5 cm/sec     ______________________________________________________________________________  Report approved by: India Nunez Dr on 2024 03:40 PM

## 2024-12-27 NOTE — PROGRESS NOTES
RCAT Assessment for chest physiotherapy and airway clearance      Assessment:     Reason for Assessment: Other (see comments) (RCAT protocol) (12/27/24 0747)    Pulmonary History:    Pulmonary Status: Smoking history, greater than or equal to 1 PPD (12/27/24 0747)    Surgical:  Surgical Status: No surgery or greater than/equal 4 weeks post-op (12/27/24 0747)    CXR:   Chest X-ray: Infiltrates, atelectasis, or pleural effusions in one lung (12/27/24 0747)    Respiratory Pattern:    Respiratory Pattern: Regular pattern 12-20 (12/27/24 0747)    Breath Sounds:    Breath Sounds: Clear to auscultation (12/27/24 0747)    Effectiveness of Cough:     Cough Effectiveness: Strong, non-productive (12/27/24 0747)    Mental Status:    Mental Status: Alert, oriented, cooperative (12/27/24 0747)    Level of Activity:     Acuity Level : Acuity 4 (6-10 points) (12/27/24 0747)    Current O2 Requirement:   O2 Required for SpO2>=92%: 1-3 liters (12/27/24 0747)      Per RCAT protocol, neb frequency adjusted to PRN.     Jimbo Ayers RT on 12/27/2024 at 8:01 AM

## 2024-12-27 NOTE — SUMMARY OF CARE
Transferred from:  WB  Report received from: KAILA, RN          2 RN skin assessment completed by: Mandie GARCIA RN and Jolanta NAM RN      - Findings (add LDA if needed): Redness fransisco, mepilex added to coccyx  Care plan (primary problem) and education initiated if not already done: Yes  MDRO education done if applicable: Yes  Pt informed about policy regarding no IV pumps off unit: Yes  Suction set up in room? Yes  Flu shot ordered? (October-April only): Pt refused  Detailed Belongings:      Cellphone  Wallet  Keys  Jacket  Ski pants  Sweatpants  Sweater  Socks  Underwear

## 2024-12-28 ENCOUNTER — ANCILLARY PROCEDURE (OUTPATIENT)
Dept: ULTRASOUND IMAGING | Facility: CLINIC | Age: 74
End: 2024-12-28
Attending: EMERGENCY MEDICINE
Payer: COMMERCIAL

## 2024-12-28 LAB
ANION GAP SERPL CALCULATED.3IONS-SCNC: 15 MMOL/L (ref 7–15)
ANION GAP SERPL CALCULATED.3IONS-SCNC: 9 MMOL/L (ref 7–15)
BUN SERPL-MCNC: 12.8 MG/DL (ref 8–23)
BUN SERPL-MCNC: 14.6 MG/DL (ref 8–23)
CALCIUM SERPL-MCNC: 8.3 MG/DL (ref 8.8–10.4)
CALCIUM SERPL-MCNC: 8.6 MG/DL (ref 8.8–10.4)
CHLORIDE SERPL-SCNC: 104 MMOL/L (ref 98–107)
CHLORIDE SERPL-SCNC: 99 MMOL/L (ref 98–107)
CHOLEST SERPL-MCNC: 139 MG/DL
CREAT SERPL-MCNC: 0.95 MG/DL (ref 0.67–1.17)
CREAT SERPL-MCNC: 0.98 MG/DL (ref 0.67–1.17)
EGFRCR SERPLBLD CKD-EPI 2021: 81 ML/MIN/1.73M2
EGFRCR SERPLBLD CKD-EPI 2021: 85 ML/MIN/1.73M2
ERYTHROCYTE [DISTWIDTH] IN BLOOD BY AUTOMATED COUNT: 13.2 % (ref 10–15)
GLUCOSE SERPL-MCNC: 116 MG/DL (ref 70–99)
GLUCOSE SERPL-MCNC: 86 MG/DL (ref 70–99)
HCO3 SERPL-SCNC: 20 MMOL/L (ref 22–29)
HCO3 SERPL-SCNC: 23 MMOL/L (ref 22–29)
HCT VFR BLD AUTO: 39.2 % (ref 40–53)
HDLC SERPL-MCNC: 39 MG/DL
HGB BLD-MCNC: 13.4 G/DL (ref 13.3–17.7)
LDLC SERPL CALC-MCNC: 78 MG/DL
MAGNESIUM SERPL-MCNC: 2 MG/DL (ref 1.7–2.3)
MAGNESIUM SERPL-MCNC: 2 MG/DL (ref 1.7–2.3)
MCH RBC QN AUTO: 31.5 PG (ref 26.5–33)
MCHC RBC AUTO-ENTMCNC: 34.2 G/DL (ref 31.5–36.5)
MCV RBC AUTO: 92 FL (ref 78–100)
NONHDLC SERPL-MCNC: 100 MG/DL
PHOSPHATE SERPL-MCNC: 3.1 MG/DL (ref 2.5–4.5)
PLATELET # BLD AUTO: 161 10E3/UL (ref 150–450)
POTASSIUM SERPL-SCNC: 3.8 MMOL/L (ref 3.4–5.3)
POTASSIUM SERPL-SCNC: 4.2 MMOL/L (ref 3.4–5.3)
RBC # BLD AUTO: 4.25 10E6/UL (ref 4.4–5.9)
SODIUM SERPL-SCNC: 134 MMOL/L (ref 135–145)
SODIUM SERPL-SCNC: 136 MMOL/L (ref 135–145)
TRIGL SERPL-MCNC: 110 MG/DL
WBC # BLD AUTO: 3.7 10E3/UL (ref 4–11)

## 2024-12-28 PROCEDURE — 250N000011 HC RX IP 250 OP 636: Performed by: INTERNAL MEDICINE

## 2024-12-28 PROCEDURE — 250N000013 HC RX MED GY IP 250 OP 250 PS 637

## 2024-12-28 PROCEDURE — 80061 LIPID PANEL: CPT

## 2024-12-28 PROCEDURE — 36415 COLL VENOUS BLD VENIPUNCTURE: CPT

## 2024-12-28 PROCEDURE — 85014 HEMATOCRIT: CPT

## 2024-12-28 PROCEDURE — 250N000011 HC RX IP 250 OP 636

## 2024-12-28 PROCEDURE — 83735 ASSAY OF MAGNESIUM: CPT

## 2024-12-28 PROCEDURE — 82435 ASSAY OF BLOOD CHLORIDE: CPT

## 2024-12-28 PROCEDURE — 93005 ELECTROCARDIOGRAM TRACING: CPT

## 2024-12-28 PROCEDURE — 250N000009 HC RX 250

## 2024-12-28 PROCEDURE — 999N000157 HC STATISTIC RCP TIME EA 10 MIN

## 2024-12-28 PROCEDURE — 84100 ASSAY OF PHOSPHORUS: CPT

## 2024-12-28 PROCEDURE — 80051 ELECTROLYTE PANEL: CPT

## 2024-12-28 PROCEDURE — 120N000002 HC R&B MED SURG/OB UMMC

## 2024-12-28 PROCEDURE — 94640 AIRWAY INHALATION TREATMENT: CPT

## 2024-12-28 PROCEDURE — 258N000003 HC RX IP 258 OP 636: Performed by: INTERNAL MEDICINE

## 2024-12-28 PROCEDURE — 80048 BASIC METABOLIC PNL TOTAL CA: CPT

## 2024-12-28 PROCEDURE — 99233 SBSQ HOSP IP/OBS HIGH 50: CPT

## 2024-12-28 RX ORDER — LOSARTAN POTASSIUM 25 MG/1
25 TABLET ORAL DAILY
Status: DISCONTINUED | OUTPATIENT
Start: 2024-12-28 | End: 2024-12-29 | Stop reason: HOSPADM

## 2024-12-28 RX ORDER — METOPROLOL SUCCINATE 25 MG/1
25 TABLET, EXTENDED RELEASE ORAL DAILY
Status: DISCONTINUED | OUTPATIENT
Start: 2024-12-28 | End: 2024-12-29 | Stop reason: HOSPADM

## 2024-12-28 RX ORDER — ATORVASTATIN CALCIUM 10 MG/1
10 TABLET, FILM COATED ORAL EVERY EVENING
Status: DISCONTINUED | OUTPATIENT
Start: 2024-12-28 | End: 2024-12-29 | Stop reason: HOSPADM

## 2024-12-28 RX ORDER — METOPROLOL TARTRATE 1 MG/ML
10 INJECTION, SOLUTION INTRAVENOUS EVERY 5 MIN PRN
Status: DISCONTINUED | OUTPATIENT
Start: 2024-12-28 | End: 2024-12-29 | Stop reason: HOSPADM

## 2024-12-28 RX ORDER — ASPIRIN 81 MG/1
81 TABLET, CHEWABLE ORAL DAILY
Status: DISCONTINUED | OUTPATIENT
Start: 2024-12-28 | End: 2024-12-28

## 2024-12-28 RX ORDER — POTASSIUM CHLORIDE 1.5 G/1.58G
20 POWDER, FOR SOLUTION ORAL ONCE
Status: COMPLETED | OUTPATIENT
Start: 2024-12-28 | End: 2024-12-28

## 2024-12-28 RX ORDER — METOPROLOL TARTRATE 1 MG/ML
10 INJECTION, SOLUTION INTRAVENOUS EVERY 5 MIN PRN
Status: DISCONTINUED | OUTPATIENT
Start: 2024-12-28 | End: 2024-12-28

## 2024-12-28 RX ADMIN — APIXABAN 5 MG: 5 TABLET, FILM COATED ORAL at 21:05

## 2024-12-28 RX ADMIN — ASPIRIN 81 MG CHEWABLE TABLET 81 MG: 81 TABLET CHEWABLE at 11:05

## 2024-12-28 RX ADMIN — POTASSIUM CHLORIDE 20 MEQ: 1.5 POWDER, FOR SOLUTION ORAL at 21:05

## 2024-12-28 RX ADMIN — IPRATROPIUM BROMIDE AND ALBUTEROL SULFATE 3 ML: .5; 3 SOLUTION RESPIRATORY (INHALATION) at 10:37

## 2024-12-28 RX ADMIN — ENOXAPARIN SODIUM 40 MG: 40 INJECTION SUBCUTANEOUS at 08:54

## 2024-12-28 RX ADMIN — METOPROLOL TARTRATE 10 MG: 5 INJECTION INTRAVENOUS at 12:20

## 2024-12-28 RX ADMIN — LOSARTAN POTASSIUM 25 MG: 25 TABLET, FILM COATED ORAL at 10:18

## 2024-12-28 RX ADMIN — AZITHROMYCIN MONOHYDRATE 500 MG: 500 INJECTION, POWDER, LYOPHILIZED, FOR SOLUTION INTRAVENOUS at 05:37

## 2024-12-28 RX ADMIN — ATORVASTATIN CALCIUM 10 MG: 10 TABLET, FILM COATED ORAL at 21:05

## 2024-12-28 RX ADMIN — METOPROLOL SUCCINATE 25 MG: 25 TABLET, EXTENDED RELEASE ORAL at 10:18

## 2024-12-28 RX ADMIN — OSELTAMIVIR PHOSPHATE 75 MG: 75 CAPSULE ORAL at 08:54

## 2024-12-28 RX ADMIN — CEFTRIAXONE SODIUM 2 G: 2 INJECTION, POWDER, FOR SOLUTION INTRAMUSCULAR; INTRAVENOUS at 15:45

## 2024-12-28 RX ADMIN — OSELTAMIVIR PHOSPHATE 75 MG: 75 CAPSULE ORAL at 21:05

## 2024-12-28 ASSESSMENT — ACTIVITIES OF DAILY LIVING (ADL)
ADLS_ACUITY_SCORE: 32

## 2024-12-28 NOTE — PLAN OF CARE
Goal Outcome Evaluation:      Plan of Care Reviewed With: patient    Overall Patient Progress: improving    Outcome Evaluation: AOx4, able to make needs known. Up IND in room. Able to wean to RA. Increased audible wheezing and SOB this afternoon, O2 sats stable. Neb given with some relief. Tele with LBBB and triplet PACs. IV abx switched to rocephin, pt tolerating. CT angiogram performed today, results pending. Continue to monitor tele and cardiac symptoms.

## 2024-12-28 NOTE — PROGRESS NOTES
[facility]    Medicine Progress Note - Hospitalist Service, GOLD TEAM 8    Date of Admission:  12/26/2024    Assessment & Plan   Royal Adriano Duckworth is a 74 year old male who does not doctor frequently and has no known past medical history, who was admitted to ICU on 12/26/2024 for shortness of breath and acute hypoxic/hypercapnic respiratory failure initially requiring BiPAP 2/2 H1N1 virus/CAP, also noted to have elevated troponin  concerning for ACS vs demand ischemia.  Subsequently transferred to [place] given depressed ejection fraction.     Updates today  -Developed A-fib with RVR  -Started apixaban  -Metoprolol succinate 25 mg/day  -Losartan 25 mg p.o. per day  -Atorvastatin 40 mg p.o. per day    #A-fib with RVR  Developed A-fib with RVR.  ([id number] score of  2 for HF and age)  -Rate controlled with metoprolol 25 mg/day. May consider increasing the dose  -IV metoprolol as needed  -Apixaban  -Need to discuss with cardiology for possible rhythm control if A-fib with RVR persists      # SVT at admission  # Left bundle branch block  # Elevated troponin  # HFrEF  # History of remote cocaine use  # History of alcohol use  No known prior cardiac history.  Chest heaviness and admission for hypoxic respiratory failure as above.Iinitial troponin 16-->61-->196-->peaked at 198. Consider demand ischemia 2/2 acute hypoxic respiratory failure vs ACS given unknown cardiac hx. BNP 1695, received 80 mg initially.  TTE with EF of 35 to 40% regional wall motion abnormality.  Differential include critical illness cardiomyopathy, alcohol use, ischemic heart disease cocaine use  etc. Currently euvolemic.  - Serial Troponin, EKGs Q4H  - Cardiology consulted, appreciate recs  - CTA coronary  - Metoprolol succinate 25 mg/day  -Losartan 25 mg/day   The GDMT may be discontinued if  -Repeat echocardiography is normal        Nonobstructive coronary artery disease  Mild obstructive disease  "noted on CTA  -Atorvastatin  -Apixaban as above, if apixaban is discontinued needs to be put on aspirin.    # Acute hypoxic, hypercapnic respiratory failure  # Influenza A, H1N1  # Community-acquired Pneumonia  Reported SOB x1 day,found to have O2 sats 40s. Initial VBG pH 7.14/CO2 of 64. Received multiple duonebs, started on BiPAP, broad spectrum antibiotics and Tamiflu initiated. CT PE negative for acute PE, diffuse GGO to bilateral upper lobes, scattered patchy pulmonary opacities, likely infectious. Was put  on BiPAP.  Was put on inhalers, Zosyn and azithromycin with subsequent improvement of hypoxia.  - Weaned to room air  - SpO2 goal >90%  - Continue ceftriaxone plus azithromycin  - Continue Tamiflu  - Encourage cough & deep breathing  - Duonebs TID  - Follow cultures  - Would likely benefit from PFTs outpatient as CT noted upper lobe emphysematous changes          Diet: Regular Diet Adult    DVT Prophylaxis: Enoxaparin (Lovenox) SQ  Davies Catheter: Not present  Lines: None     Cardiac Monitoring: ACTIVE order. Indication: Tachyarrhythmias, acute (48 hours)  Code Status: Full Code      Clinically Significant Risk Factors         # Hyponatremia: Lowest Na = 134 mmol/L in last 2 days, will monitor as appropriate              # Acute heart failure with reduced ejection fraction: last echo with EF <40% and receiving IV diuretics    # Acute Hypercapnic Respiratory Failure: based on venous blood gas results.  Continue supplemental oxygen and ventilatory support as indicated.         # Overweight: Estimated body mass index is 25.36 kg/m  as calculated from the following:    Height as of this encounter: 1.803 m (5' 11\").    Weight as of this encounter: 82.5 kg (181 lb 12.8 oz)., PRESENT ON ADMISSION       # Financial/Environmental Concerns: none         Social Drivers of Health    Housing Stability: High Risk (12/26/2024)    Housing Stability     Do you have housing? : No     Are you worried about losing your housing?: " No   Tobacco Use: High Risk (12/26/2024)    Patient History     Smoking Tobacco Use: Every Day     Smokeless Tobacco Use: Never          Disposition Plan     Medically Ready for Discharge: Anticipated Tomorrow             Miguel Le MD  Hospitalist Service, GOLD TEAM [address] [place] [address]  Securely message with [employer] (more info)  Text page via [facility] Paging/Directory   See signed in provider for up to date coverage information  ______________________________________________________________________    Interval History   Patient expressing the desire to go home.  He was emotional when I saw him.  He is overall medically with a new diagnosis.  He is also worried about insurance coverage.  Developed A-fib RVR in the afternoon..    Physical Exam   Vital Signs: Temp: 98.3  F (36.8  C) Temp src: Oral BP: (!) 114/100 Pulse: 92   Resp: 18 SpO2: 94 % O2 Device: None (Room air) Oxygen Delivery: 1 LPM  Weight: 181 lbs 12.8 oz    Constitutional: Not in acute disteress   Eyes: No scleral icterus. PERRLA. EOMI  Respiratory: Breathing comfortably on room air. CTAB. No accessory muscle use. No crackles, wheezes, rhonchi or rales.  Cardiovascular: No JVD, RRR. No murmurs, rubs or gallops.  GI: Soft, nontender, nondistended. No guarding or rebound tenderness.   Skin: No rash. No ecchymoses or petechiae.  Musculoskeletal: Normal muscle bulk and tone. Extremities warm and well perfused. No edema  Neurologic: AOx3.       Medical Decision Making       55 MINUTES SPENT BY ME on the date of service doing chart review, history, exam, documentation & further activities per the note.      Data     I have personally reviewed the following data over the past 24 hrs:    3.7 (L)  \   13.4   / 161     134 (L) 99 12.8 /  116 (H)   3.8 20 (L) 0.95 \     Trop: N/A BNP: N/A       Imaging results reviewed over the past 24 hrs:   Recent Results (from the past 24 hours)   CT CORONARY ARTERY ANGIO W CALCIUM SCORE     Narrative    Procedure: CT CORONARY ARTERY ANGIO W CALCIUM SCORE   Examination Date: 2024 3:11 PM   Indication: new LBBB, elevated troponin   Ordering Provider: Chaya Mosqueda MD  Overall quality of the study: Fair. There is significant cardiac  motion artifact.    PROCEDURE: ECG gated multi-slice computed tomography of the heart with  and without intravenous contrast  (Isovue 370, 220 mL at rate of 5.5  mL/sec) was performed on a Siemens Dual Source Flash scanner without  incident. Medical therapy was used to optimize heart rate (metoprolol  50 mg oral, ivabradine 10 oral,  metoprolol 20 mg IV). Sublingual  Nitrostat 0.8 mg was given prior to scanning. Coronary artery calcium  scoring was performed using the Flash scanner protocol. The CTA  portion was performed in the spiral mode at a heart rate of 72 bpm  with 100 kVp. Images were reconstructed and analyzed on a [employer]  workstation. The scan protocol was optimized to minimize radiation  exposure. The total radiation exposure, including calcium artery  calcium scoring, was calculated to be 1025 DLP and 14.35 mSv.        Impression    IMPRESSION:  1.  Technically suboptimal study due to significant cardiac motion  artifact related to insufficiency heart rate control despite maximal  pre-medication and frequent PVCs. Diagnostic accuracy may be reduced.  2.  Mild nonobstructive coronary artery disease.  3.  Non-diagnostic imaging of the distal RCA.  4.  Minimal nonobstructive coronary artery disease in the LAD, LCX and  Mild stenosis in the visualized segments of the RCA  5.  Total Agatston score 679 placing the patient in the 74 percentile  when compared to an age- and gender-matched control group.  6.  Please review the separate Radiology report for incidental  noncardiac findings.    FINDINGS:    CORONARY CALCIUM SCORE    Total Agatston calcium score: 679   Left main: 211  Left anterior descendin  Left circumflex: 47  Right coronary artery: 342    This places the patient in the 74  percentile when compared to an age-  and gender-matched control group.    CORONARY ANGIOGRAPHY    DOMINANCE: Right dominant system.   Normal coronary origins and course.    LEFT MAIN:   The LM is a moderate caliber vessel.   The left main arises normally from the left coronary cusp and has a  minimal (<25%) stenosis composed of mixed plaque.    LEFT ANTERIOR DESCENDING:   The LAD is a moderate caliber vessel. It has a type III morphology. It  gives rise to small caliber D1,D2 and D3.     Proximal LAD: Minimal (<25%) stenosis composed of noncalcified plaque.  Mid LAD: Minimal (<25%) stenosis composed of calcified plaque..  The remainder of the left anterior descending and its major diagonal  branches are patent with minimal luminal irregularities.    LEFT CIRCUMFLEX:   The LCx is a small caliber vessel. It gives rise to a small caliber  OM1  Proximal LCX: Minimal (<25%) stenosis composed of mixed plaque.  Mid LCX: Minimal (<25%) stenosis composed of mixed plaque.  The remainder of the left circumflex and its major marginal branches  are patent with minimal luminal irregularities.    RIGHT CORONARY ARTERY:   The RCA is a moderate caliber vessel.    Proximal RCA: Mild (25-49%) stenosis composed of mixed plaque.  Mid RCA: : Minimal (<25%) stenosis composed of calcified plaque  Distal RCA: This segment is non diagnostic due to cardiac motion  artifact    ADDITIONAL FINDINGS:   The proximal ascending aorta is normal in size.   Normal pulmonary venous anatomy with all four pulmonary veins draining  into the left atrium.    The left atrial appendage is free of thrombus.  There is no left ventricular mass or thrombus.   Normal pericardial thickness. There is no pericardial effusion.  Please review the separate Radiology report for incidental noncardiac  findings.    I have personally reviewed the examination and initial interpretation  and I agree with the findings.    FRANCINE MOULTON,  MD         SYSTEM ID:  [id number]   Radiologist Consult For Cardiology    Narrative    EXAMINATION: RADIOLOGIST CONSULT FOR CARDIOLOGY, 12/27/2024 3:11 PM     COMPARISON: CT PE 12/26/2024.    HISTORY: New left bundle branch block, elevated troponin. Current  influenza infection with respiratory failure and SVT.    TECHNIQUE: Cardiac CT was performed by cardiology. Interpretation of  the noncardiac portions of the study was requested.  Field of view  extending from approximately the amalia to the upper abdomen    FINDINGS:   Heart size is within normal limits. Normal caliber of the visualized  thoracic aorta and main pulmonary artery. Atherosclerotic  calcifications and soft plaque formation of the included thoracic  aorta. Moderate coronary artery atherosclerotic calcifications. No  pericardial effusion. Few small precarinal nodes are incompletely  included in the exam. Right hilar lymph lymph nodes measuring up to  1.2 cm (9/43) are likely reactive.    No pneumothorax at this level. Small right pleural effusion. Improved  ground glass attenuation through the included lung parenchyma with  decreasing conspicuity of the interlobular septal thickening. Multiple  groundglass nodular opacities are demonstrated throughout, for example  a 5 mm nodule in the right middle lobe (16/20). Paraseptal  emphysematous changes.    Subcentimeter hypoattenuating hepatic observation (16/43, most likely  representing a small, benign hepatic cyst. The included upper abdomen  is otherwise unremarkable.. No acute osseous lesions of the visualized  thorax. Degenerative changes of the thoracic spine.      Impression    IMPRESSION:  1. Improved groundglass opacification and interlobular septal  thickening of the included lung parenchyma likely representing  improving pulmonary edema.  2. Multiple small pulmonary nodules, predominantly in the right upper  and middle lobes, including a 5 mm ground nodule. Consider short term  follow-up.  3.  Stable small right pleural effusion with associated compressive  atelectasis.  4. Please see separate report for the cardiac portions of the study.    I have personally reviewed the examination and initial interpretation  and I agree with the findings.    LORENE CORDOBA MD         SYSTEM ID:  [id number]

## 2024-12-28 NOTE — PLAN OF CARE
Goal Outcome Evaluation:      Plan of Care Reviewed With: patient    Overall Patient Progress: no changeOverall Patient Progress: no change    Outcome Evaluation: Pt alert and oriented x4 ,Independent on room. complains of headache gave tylenol with relief , no complains of chest tightness, , on Oxygen at 1L  via nasal cannula with no SOB, with tele . Continue plan of care.

## 2024-12-29 VITALS
HEIGHT: 71 IN | DIASTOLIC BLOOD PRESSURE: 77 MMHG | SYSTOLIC BLOOD PRESSURE: 135 MMHG | RESPIRATION RATE: 18 BRPM | OXYGEN SATURATION: 96 % | BODY MASS INDEX: 25.45 KG/M2 | HEART RATE: 71 BPM | TEMPERATURE: 98.6 F | WEIGHT: 181.8 LBS

## 2024-12-29 LAB
ANION GAP SERPL CALCULATED.3IONS-SCNC: 10 MMOL/L (ref 7–15)
BUN SERPL-MCNC: 11.4 MG/DL (ref 8–23)
CALCIUM SERPL-MCNC: 8.2 MG/DL (ref 8.8–10.4)
CHLORIDE SERPL-SCNC: 105 MMOL/L (ref 98–107)
CREAT SERPL-MCNC: 0.88 MG/DL (ref 0.67–1.17)
EGFRCR SERPLBLD CKD-EPI 2021: >90 ML/MIN/1.73M2
ERYTHROCYTE [DISTWIDTH] IN BLOOD BY AUTOMATED COUNT: 12.9 % (ref 10–15)
GLUCOSE SERPL-MCNC: 88 MG/DL (ref 70–99)
HCO3 SERPL-SCNC: 23 MMOL/L (ref 22–29)
HCT VFR BLD AUTO: 39.5 % (ref 40–53)
HGB BLD-MCNC: 13.4 G/DL (ref 13.3–17.7)
MAGNESIUM SERPL-MCNC: 2 MG/DL (ref 1.7–2.3)
MCH RBC QN AUTO: 31.2 PG (ref 26.5–33)
MCHC RBC AUTO-ENTMCNC: 33.9 G/DL (ref 31.5–36.5)
MCV RBC AUTO: 92 FL (ref 78–100)
PHOSPHATE SERPL-MCNC: 3.2 MG/DL (ref 2.5–4.5)
PLATELET # BLD AUTO: 149 10E3/UL (ref 150–450)
POTASSIUM SERPL-SCNC: 4.1 MMOL/L (ref 3.4–5.3)
RBC # BLD AUTO: 4.3 10E6/UL (ref 4.4–5.9)
SODIUM SERPL-SCNC: 138 MMOL/L (ref 135–145)
WBC # BLD AUTO: 3.1 10E3/UL (ref 4–11)

## 2024-12-29 PROCEDURE — 250N000013 HC RX MED GY IP 250 OP 250 PS 637

## 2024-12-29 PROCEDURE — 84100 ASSAY OF PHOSPHORUS: CPT

## 2024-12-29 PROCEDURE — 36415 COLL VENOUS BLD VENIPUNCTURE: CPT

## 2024-12-29 PROCEDURE — 99239 HOSP IP/OBS DSCHRG MGMT >30: CPT

## 2024-12-29 PROCEDURE — 83735 ASSAY OF MAGNESIUM: CPT

## 2024-12-29 PROCEDURE — 80048 BASIC METABOLIC PNL TOTAL CA: CPT

## 2024-12-29 PROCEDURE — 85027 COMPLETE CBC AUTOMATED: CPT

## 2024-12-29 PROCEDURE — 250N000011 HC RX IP 250 OP 636

## 2024-12-29 PROCEDURE — 82435 ASSAY OF BLOOD CHLORIDE: CPT

## 2024-12-29 RX ORDER — LOSARTAN POTASSIUM 25 MG/1
25 TABLET ORAL DAILY
Qty: 30 TABLET | Refills: 2 | Status: SHIPPED | OUTPATIENT
Start: 2024-12-30

## 2024-12-29 RX ORDER — IPRATROPIUM BROMIDE AND ALBUTEROL SULFATE 2.5; .5 MG/3ML; MG/3ML
3 SOLUTION RESPIRATORY (INHALATION) EVERY 4 HOURS PRN
Qty: 90 ML | Refills: 2 | Status: SHIPPED | OUTPATIENT
Start: 2024-12-29

## 2024-12-29 RX ORDER — ALBUTEROL SULFATE 90 UG/1
2 INHALANT RESPIRATORY (INHALATION) EVERY 6 HOURS PRN
Qty: 68 G | Refills: 3 | Status: SHIPPED | OUTPATIENT
Start: 2024-12-29

## 2024-12-29 RX ORDER — OSELTAMIVIR PHOSPHATE 75 MG/1
75 CAPSULE ORAL 2 TIMES DAILY
Qty: 8 CAPSULE | Refills: 0 | Status: SHIPPED | OUTPATIENT
Start: 2024-12-29 | End: 2025-01-02

## 2024-12-29 RX ORDER — ATORVASTATIN CALCIUM 10 MG/1
10 TABLET, FILM COATED ORAL EVERY EVENING
Qty: 90 TABLET | Refills: 2 | Status: SHIPPED | OUTPATIENT
Start: 2024-12-29

## 2024-12-29 RX ORDER — METOPROLOL SUCCINATE 25 MG/1
25 TABLET, EXTENDED RELEASE ORAL ONCE
Status: COMPLETED | OUTPATIENT
Start: 2024-12-29 | End: 2024-12-29

## 2024-12-29 RX ORDER — BENZOCAINE/MENTHOL 6 MG-10 MG
LOZENGE MUCOUS MEMBRANE 2 TIMES DAILY
Status: DISCONTINUED | OUTPATIENT
Start: 2024-12-29 | End: 2024-12-29 | Stop reason: HOSPADM

## 2024-12-29 RX ORDER — METOPROLOL SUCCINATE 25 MG/1
50 TABLET, EXTENDED RELEASE ORAL DAILY
Qty: 30 TABLET | Refills: 3 | Status: SHIPPED | OUTPATIENT
Start: 2024-12-30

## 2024-12-29 RX ORDER — CEFPODOXIME PROXETIL 200 MG/1
200 TABLET, FILM COATED ORAL 2 TIMES DAILY
Qty: 10 TABLET | Refills: 0 | Status: SHIPPED | OUTPATIENT
Start: 2024-12-29 | End: 2025-01-03

## 2024-12-29 RX ORDER — ACETAMINOPHEN 325 MG/1
975 TABLET ORAL EVERY 6 HOURS PRN
Qty: 30 TABLET | Refills: 1 | Status: SHIPPED | OUTPATIENT
Start: 2024-12-29

## 2024-12-29 RX ADMIN — METOPROLOL SUCCINATE 25 MG: 25 TABLET, EXTENDED RELEASE ORAL at 08:33

## 2024-12-29 RX ADMIN — APIXABAN 5 MG: 5 TABLET, FILM COATED ORAL at 08:33

## 2024-12-29 RX ADMIN — LOSARTAN POTASSIUM 25 MG: 25 TABLET, FILM COATED ORAL at 08:33

## 2024-12-29 RX ADMIN — OSELTAMIVIR PHOSPHATE 75 MG: 75 CAPSULE ORAL at 08:33

## 2024-12-29 RX ADMIN — METOPROLOL SUCCINATE 25 MG: 25 TABLET, EXTENDED RELEASE ORAL at 11:46

## 2024-12-29 ASSESSMENT — ACTIVITIES OF DAILY LIVING (ADL)
ADLS_ACUITY_SCORE: 32
ADLS_ACUITY_SCORE: 32
ADLS_ACUITY_SCORE: 33
ADLS_ACUITY_SCORE: 33
ADLS_ACUITY_SCORE: 32
ADLS_ACUITY_SCORE: 32
ADLS_ACUITY_SCORE: 33
ADLS_ACUITY_SCORE: 33
ADLS_ACUITY_SCORE: 32
ADLS_ACUITY_SCORE: 33
ADLS_ACUITY_SCORE: 32
ADLS_ACUITY_SCORE: 33
ADLS_ACUITY_SCORE: 32

## 2024-12-29 NOTE — PLAN OF CARE
Goal Outcome Evaluation:      Plan of Care Reviewed With: patient    Overall Patient Progress: no change    Outcome Evaluation: AOx4, able to make needs known. Up IND in room. Able to wean to RA but still desats intermittently, self corrected. Increased audible wheezing and SOB this afternoon, O2 sats stable. Neb given with some relief. Tele with LBBB and triplet PACs. Pt had episode of a fib RVR, IV metroprolol was given. HR continues to fluctuate but no sustained RVR. PRN metoprolol available for RVR >140. Continue to monitor tele and cardiac symptoms.

## 2024-12-29 NOTE — PLAN OF CARE
Discharge to: Home   Transportation: Independent via taxi  Time: 1700  Prescriptions: Picked up all discharge medications from discharge pharmacy prior to leaving.   Belongings: left wearing clothes he brought. All other belonging placed in one bag and pt confirmed all was accounted for.    -if applicable return home meds from inpatient pharmacy: N/a  Access: 2 L arm PIV removed prior to discharge.   Care plan and education discontinued: Yes  Paperwork:  AVS printed and all questions and concerns addressed with pt prior to discharging. Next steps discussed

## 2024-12-29 NOTE — PLAN OF CARE
Goal Outcome Evaluation:      Plan of Care Reviewed With: patient    Overall Patient Progress: no changeOverall Patient Progress: no change         Pt is A & O x4. Standby assist - independent in the room. Able to make needs known. 2 Left PIVs & saline locked. On 1L of O2 for bedtime. Pt is currently on tele and VS is stable. Void spontaneously. Will continue with plan of care.

## 2024-12-29 NOTE — DISCHARGE SUMMARY
"[facility]  Hospitalist Discharge Summary      Date of Admission:  12/26/2024  Date of Discharge:  12/29/2024  5:15 PM  Discharging Provider: Miguel Le MD  Discharge Service: Hospitalist Service, GOLD TEAM 8    Discharge Diagnoses   # Paroxismal A-fib with RVR  # SVT at admission  # Left bundle branch block  # Elevated troponin  # Newly diagnosed HFrEF  # History of remote cocaine use  # History of alcohol use  # Influenza  #Community-acquired pneumonia     Clinically Significant Risk Factors     # Overweight: Estimated body mass index is 25.36 kg/m  as calculated from the following:    Height as of this encounter: 1.803 m (5' 11\").    Weight as of this encounter: 82.5 kg (181 lb 12.8 oz).       Follow-ups Needed After Discharge   Follow-up Appointments       Adult [facility] Follow-up and recommended labs and tests      Follow up with primary care provider, Physician No Ref-Primary, within 7 days to evaluate medication change, for hospital follow- up, and regarding new diagnosis.  No follow up labs or test are needed.      Appointments on [place] and/or [facility] (with [facility] or [facility] provider or service). Call [phone] if you haven't heard regarding these appointments within 7 days of discharge.                Unresulted Labs Ordered in the Past 30 Days of this Admission       Date and Time Order Name Status Description    12/26/2024  2:27 AM Blood Culture Arm, Right Preliminary     12/26/2024  2:27 AM Blood Culture Peripheral Blood Preliminary         These results will be followed up by PCP    Discharge Disposition   Discharged to home  Condition at discharge: Stable    Hospital Course   Royal Adriano Duckworth is a 74 year old male who does not doctor frequently and has no known past medical history, who was admitted to ICU on 12/26/2024 for shortness of breath and acute hypoxic/hypercapnic respiratory failure initially requiring BiPAP 2/2 H1N1 virus/CAP, also " noted to have elevated troponin  concerning for ACS vs demand ischemia transferred to [facility] for cardiac eval      # Acute hypoxic repiratory failure, resolved  # Community-acquired pneumonia  # Influenza  Admitted with acute hypoxic respiratory failure requiring BiPAP.  CT multiple patchy ground glass noted opacities.    Was initially put on Zosyn and azithromycin, de-escalated to ceftriaxone and azithromycin.  Hypoxia resolved. Was also put on Tamiflu.  -Cefpodoxime for 5 days  -Tamiflu for 3 more days to finish 7-day course    # SVT at admission  # Left bundle branch block  # Newly diagnosed HFrEF  # History of remote cocaine use  # History of alcohol use  # Elevated Troponin secondary to demand ischemia  No known prior cardiac history.  Chest heaviness and admission and hypoxic respiratory failure as above.  Elevated troponin likely secondary to demand ischemia 2/2 acute hypoxic respiratory failure.  BNP 1695.  TTE with EF of 35 to 40% without regional wall motion abnormality.  CTA with mild nonobstructive coronary artery disease.  Differentials for low EF include critical illness cardiomyopathy, alcohol use, cocaine use, tachycardia induced etc.Euvolemic on exam  -Metoprolol succinate 50 mg/day  -Losartan 25 mg/day   -Cardiology follow-up  -Repeat echocardiography  3  months.        # Paroxysmal A-fib with RVR  Developed A-fib with RVR.  ([id number] score of  2 for HF and age)  -Rate controlled with metoprolol 50 mg/day.   -Apixaban  -May need zio-patch monitoring.  If no recurrence of A-fib, apixaban could be discontinued    Nonobstructive coronary artery disease  Evidence of mild nonobstructive coronary artery disease noted on CTA  -Atorvastatin  -Apixaban as above    #Diffuse wheezing   #History of tobacco smoking  #C/f COPD  Patient had intermittent wheezing during hospital stay.  He has history of smoking and quit 1 year ago.  Concern for underlying COPD  -PFT referral  -Albuterol as needed  -DuoNebs as  needed (needs a nebulizer machine to be prescribed his PCP        Consultations This Hospital Stay   PHARMACY TO DOSE VANCO  PHARMACY TO DOSE VANCO  CARE MANAGEMENT / SOCIAL WORK IP CONSULT  CARDIOLOGY GENERAL ADULT IP CONSULT  CARDIOLOGY GENERAL ADULT IP CONSULT    Code Status   Full Code    Time Spent on this Encounter   IMiguel MD, personally saw the patient today and spent greater than 30 minutes discharging this patient.       Miguel Le MD  [facility] 7C MED SURG  [address]  [place] [address]  Phone: [phone]  ______________________________________________________________________    Physical Exam   Vital Signs: Temp: 98.6  F (37  C) Temp src: Oral BP: 135/77 Pulse: 71   Resp: 18 SpO2: 96 % O2 Device: None (Room air) Oxygen Delivery: 1 LPM  Weight: 181 lbs 12.8 oz  Constitutional: Not in acute disteress   Eyes: No scleral icterus. PERRLA. EOMI  Respiratory: Breathing comfortably on room air. CTAB. No accessory muscle use. Scattered wheezes,  Cardiovascular: No JVD, RRR. No murmurs, rubs or gallops.  GI: Soft, nontender, nondistended. No guarding or rebound tenderness.   Skin: No rash. No ecchymoses or petechiae.  Musculoskeletal: Normal muscle bulk and tone. Extremities warm and well perfused. No edema  Neurologic: AOx3.          Primary Care Physician   Physician No Ref-Primary    Discharge Orders      Adult Cardiology Eval  Referral      Follow-Up with Cardiology      Reason for your hospital stay     Activity    Your activity upon discharge: activity as tolerated     Adult [facility] Follow-up and recommended labs and tests    Follow up with primary care provider, Physician No Ref-Primary, within 7 days to evaluate medication change, for hospital follow- up, and regarding new diagnosis.  No follow up labs or test are needed.      Appointments on [place] and/or [facility] (with [facility] or [facility] provider or service). Call [phone] if you haven't heard regarding  these appointments within 7 days of discharge.     Discharge Instructions    Please schedule appointment with a primary care physician within 1 to 2 weeks.  Please schedule appointment with a cardiologist.  Please schedule appointment for lung function test.  Please take your medications as ordered.  Avoid drinking alcohol.     General PFT Lab (Please always keep checked)     Pulmonary Function Test    .     Diet    Follow this diet upon discharge: Current Diet:Orders Placed This Encounter      Regular Diet Adult       Significant Results and Procedures   Most Recent 3 CBC's:  Recent Labs   Lab Test 12/29/24  0558 12/28/24  0537 12/27/24  0551   WBC 3.1* 3.7* 5.3   HGB 13.4 13.4 12.5*   MCV 92 92 91   * 161 164     Most Recent 3 BMP's:  Recent Labs   Lab Test 12/29/24  0558 12/28/24  1248 12/28/24  0537    134* 136   POTASSIUM 4.1 3.8 4.2   CHLORIDE 105 99 104   CO2 23 20* 23   BUN 11.4 12.8 14.6   CR 0.88 0.95 0.98   ANIONGAP 10 15 9   EVANGELINA 8.2* 8.6* 8.3*   GLC 88 116* 86     Most Recent 2 LFT's:  Recent Labs   Lab Test 12/26/24  0150   AST 28   ALT 24   ALKPHOS 105   BILITOTAL 0.3     Most Recent 3 Troponin's:No lab results found.  Most Recent 3 BNP's:  Recent Labs   Lab Test 12/27/24  0551 12/26/24  0150   NTBNPI 1,172* 1,695*   ,   Results for orders placed or performed during the hospital encounter of 12/26/24   XR Chest Port 1 View    Narrative    EXAM: XR CHEST PORT 1 VIEW  LOCATION: [facility]  DATE: 12/26/2024    INDICATION: Dyspnea with hypoxia.  COMPARISON: None.      Impression    IMPRESSION: Pulmonary vascularity at the upper limits of normal. Normal heart size. Interstitial opacities in the subpleural aspect of the left lower lobe concerning for edema. Diffuse interstitial and nodular alveolar infiltrates bilaterally which can   be seen with bilateral pneumonia. Aortic calcification.   POC US ECHO LIMITED    Impression    Limited Bedside Cardiac  Ultrasound, performed and interpreted by me.   Indication: Shortness of Breath and Abnormal EKG.  apical 4 chamber and subcostal views were acquired.   Image quality was satisfactory.    Findings:    Global left ventricular function appears diminished. RV hyperdynamic.  Chambers do not appear dilated.  There is no evidence of free fluid within the pericardium.    IMPRESSION: Abnormal limited cardiac ultrasound showing diminished LV ejection fraction.  No pericardial effusion.       CT Chest Pulmonary Embolism w Contrast    Narrative    EXAM: CT CHEST PULMONARY EMBOLISM W CONTRAST  LOCATION: [facility]  DATE: 12/26/2024    INDICATION: Acute resp fail  COMPARISON: Chest x-ray on 12/26/2024  TECHNIQUE: CT chest pulmonary angiogram during arterial phase injection of IV contrast. Multiplanar reformats and MIP reconstructions were performed. Dose reduction techniques were used.   CONTRAST: 64mL Isovue 370    FINDINGS:  ANGIOGRAM CHEST: No evidence of pulmonary embolism. No evidence of thoracic aortic aneurysm or dissection. Extensive atherosclerotic vascular calcification of the thoracic aorta. There is no opacification of the proximal aspect of the left subclavian   artery which likely reconstituting distally from collaterals. No evidence for right heart strain.    LUNGS AND PLEURA: Trace right pleural effusion. No significant left pleural effusion. No significant pneumothorax. Upper lobes predominant emphysematous changes. Mild upper lobes predominant interlobular septal thickening and diffuse groundglass   pulmonary opacities, can be seen with pulmonary edema or infection. Scattered patchy nodular pulmonary opacities most prominent in the right upper lobe, likely infectious. Few scattered pulmonary nodules including 5 mm right upper lobe subpleural nodule   (series 6 image 104).    MEDIASTINUM/AXILLAE: No cardiomegaly or significant pericardial effusion. Multiple prominent  mediastinal and hilar lymph nodes, indeterminate, could be reactive.    CORONARY ARTERY CALCIFICATION: Moderate.    UPPER ABDOMEN: Limited evaluation of the upper abdomen due to lack of coverage.    MUSCULOSKELETAL: Multilevel degenerative changes of the spine. No suspicious osseous lesion.      Impression    IMPRESSION:  1.  No evidence of pulmonary embolism.  2.  Bilateral upper lobes predominant interlobular septal thickening with diffuse groundglass pulmonary opacities, can be seen with pulmonary edema or infection. Multiple small patchy nodular groundglass pulmonary opacities, could be infectious.  3.  Trace right pleural effusion.  4.  There is no opacification of the proximal aspect of the left subclavian artery, likely occluded. It appears mildly opacified distally likely reconstituting from collaterals.  5.  A few scattered pulmonary nodules including 5 mm right upper lobe subpleural nodule, as per Fleischner Society criteria, for low risk patient, no routine follow-up is recommended and for high-risk patient, optional chest CT in 12 months can be   considered.                 POC US ECHO LIMITED    Impression    Limited Bedside Cardiac Ultrasound, performed and interpreted by me.   Indication: Shortness of Breath, Elevated Troponin, and Abnormal EKG.  Parasternal long axis, parasternal short axis, apical 4 chamber, and subcostal views were acquired.   Image quality was satisfactory.    Findings:    Global left ventricular function is reduced ejection but appears improved from previous with more organized cardiac contractility with decreased ventricular rate.  Chambers do not appear dilated.  IVC collapses completely with inspiration.  There is no evidence of free fluid within the pericardium.    IMPRESSION: Abnormal limited cardiac ultrasound showing reduced LV EF.  Collapsing IVC. No pericardial effusion.         Radiologist Consult For Cardiology    Narrative    EXAMINATION: RADIOLOGIST CONSULT FOR  CARDIOLOGY, 12/27/2024 3:11 PM     COMPARISON: CT PE 12/26/2024.    HISTORY: New left bundle branch block, elevated troponin. Current  influenza infection with respiratory failure and SVT.    TECHNIQUE: Cardiac CT was performed by cardiology. Interpretation of  the noncardiac portions of the study was requested.  Field of view  extending from approximately the amalia to the upper abdomen    FINDINGS:   Heart size is within normal limits. Normal caliber of the visualized  thoracic aorta and main pulmonary artery. Atherosclerotic  calcifications and soft plaque formation of the included thoracic  aorta. Moderate coronary artery atherosclerotic calcifications. No  pericardial effusion. Few small precarinal nodes are incompletely  included in the exam. Right hilar lymph lymph nodes measuring up to  1.2 cm (9/43) are likely reactive.    No pneumothorax at this level. Small right pleural effusion. Improved  ground glass attenuation through the included lung parenchyma with  decreasing conspicuity of the interlobular septal thickening. Multiple  groundglass nodular opacities are demonstrated throughout, for example  a 5 mm nodule in the right middle lobe (16/20). Paraseptal  emphysematous changes.    Subcentimeter hypoattenuating hepatic observation (16/43, most likely  representing a small, benign hepatic cyst. The included upper abdomen  is otherwise unremarkable.. No acute osseous lesions of the visualized  thorax. Degenerative changes of the thoracic spine.      Impression    IMPRESSION:  1. Improved groundglass opacification and interlobular septal  thickening of the included lung parenchyma likely representing  improving pulmonary edema.  2. Multiple small pulmonary nodules, predominantly in the right upper  and middle lobes, including a 5 mm ground nodule. Consider short term  follow-up.  3. Stable small right pleural effusion with associated compressive  atelectasis.  4. Please see separate report for the cardiac  portions of the study.    I have personally reviewed the examination and initial interpretation  and I agree with the findings.    LORENE CORDOBA MD         SYSTEM ID:  [id number]   POC US CHEST B-SCAN (PTX/Edema/PNA)    Impression    Limited Bedside Lung Ultrasound, performed and interpreted by me.   Indication: respiratory failure    With the patient positioned upright, bilateral anterior and lung fields were examined for evidence of pulmonary consolidation and pulmonary edema.       Findings: Bilateral apical lung fields showed B-lines.     IMPRESSION: Bilateral apical alveolar interstitial syndrome consistent with pulmonary edema vs ARDS or multifocal pneumonia.       Echo Complete     Value    LVEF  35-40% (moderately reduced)    Narrative    [id number]  [id number]  [id number]  [id number]     [facility],[place]  Echocardiography Laboratory  [address] [place] [id number]     Name: ROYAL KALEN BARRIOS  MRN: [id number]  : 1950  Study Date: 2024 01:38 PM  Age: 74 yrs  Gender: Male  Patient Location: [facility]  Reason For Study: Respiratory Failure  Ordering Physician: JUNO VEGAS  Performed By: Pato Queen RDCS     BSA: 2.0 m2  Height: 71 in  Weight: 184 lb  HR: 83  BP: 109/59 mmHg  ______________________________________________________________________________  Procedure  Echocardiogram with two-dimensional, color and spectral Doppler. Contrast  Definity. Technically difficult study.Extremely poor acoustic windows.  Definity (NDC #40769-621-42) given intravenously. Patient was given 7ml  mixture of 1.5ml Definity and 8.5ml saline. 3 ml wasted.  ______________________________________________________________________________  Interpretation Summary  Technically difficult study.Extremely poor acoustic windows.  Left ventricular function is decreased. The ejection fraction is 35-40%  (moderately reduced). Base and mid anteroseptal hypokinesis  Global right  ventricular function is normal.  IVC diameter <2.1 cm collapsing >50% with sniff suggests a normal RA pressure  of 3 mmHg.  No pericardial effusion is present.  There is no prior study for direct comparison.  ______________________________________________________________________________  Left Ventricle  Left ventricular size is normal. Left ventricular function is decreased. The  ejection fraction is 35-40% (moderately reduced). Left ventricular diastolic  function is abnormal. Base and mid anteroseptal hypokinesis.     Right Ventricle  The right ventricle is normal size. Global right ventricular function is  normal.     Aortic Valve  The valve leaflets are not well visualized.     Tricuspid Valve  The valve leaflets are not well visualized. On Doppler interrogation, there is  no significant stenosis or regurgitation. The peak velocity of the tricuspid  regurgitant jet is not obtainable. Pulmonary artery systolic pressure cannot  be assessed.     Pulmonic Valve  The valve leaflets are not well visualized.     Vessels  IVC diameter <2.1 cm collapsing >50% with sniff suggests a normal RA pressure  of 3 mmHg.     Pericardium  No pericardial effusion is present.     Compared to Previous Study  There is no prior study for direct comparison.     ______________________________________________________________________________  MMode/2D Measurements & Calculations  LA Volume (BP): 21.1 ml     LA Volume Index (BP): 10.3 ml/m2  TAPSE: 2.4 cm     Doppler Measurements & Calculations  MV E max lawrence: 83.1 cm/sec  MV A max lawrence: 139.0 cm/sec  MV E/A: 0.60  MV dec slope: 445.0 cm/sec2  MV dec time: 0.19 sec  E/E' av.8  Lateral E/e': 8.6  Medial E/e': 6.9  RV S Lawrence: 12.5 cm/sec     ______________________________________________________________________________  Report approved by: India Nunez Dr on 2024 03:40 PM         CT CORONARY ARTERY ANGIO W CALCIUM SCORE    Narrative    Procedure: CT CORONARY ARTERY ANGIO  W CALCIUM SCORE   Examination Date: 2024 3:11 PM   Indication: new LBBB, elevated troponin   Ordering Provider: Chaya Mosqueda MD  Overall quality of the study: Fair. There is significant cardiac  motion artifact.    PROCEDURE: ECG gated multi-slice computed tomography of the heart with  and without intravenous contrast  (Isovue 370, 220 mL at rate of 5.5  mL/sec) was performed on a Siemens Dual Source Flash scanner without  incident. Medical therapy was used to optimize heart rate (metoprolol  50 mg oral, ivabradine 10 oral,  metoprolol 20 mg IV). Sublingual  Nitrostat 0.8 mg was given prior to scanning. Coronary artery calcium  scoring was performed using the Flash scanner protocol. The CTA  portion was performed in the spiral mode at a heart rate of 72 bpm  with 100 kVp. Images were reconstructed and analyzed on a [employer]  workstation. The scan protocol was optimized to minimize radiation  exposure. The total radiation exposure, including calcium artery  calcium scoring, was calculated to be 1025 DLP and 14.35 mSv.        Impression    IMPRESSION:  1.  Technically suboptimal study due to significant cardiac motion  artifact related to insufficiency heart rate control despite maximal  pre-medication and frequent PVCs. Diagnostic accuracy may be reduced.  2.  Mild nonobstructive coronary artery disease.  3.  Non-diagnostic imaging of the distal RCA.  4.  Minimal nonobstructive coronary artery disease in the LAD, LCX and  Mild stenosis in the visualized segments of the RCA  5.  Total Agatston score 679 placing the patient in the 74 percentile  when compared to an age- and gender-matched control group.  6.  Please review the separate Radiology report for incidental  noncardiac findings.    FINDINGS:    CORONARY CALCIUM SCORE    Total Agatston calcium score: 679   Left main: 211  Left anterior descendin  Left circumflex: 47  Right coronary artery: 342   This places the patient in the 74  percentile  when compared to an age-  and gender-matched control group.    CORONARY ANGIOGRAPHY    DOMINANCE: Right dominant system.   Normal coronary origins and course.    LEFT MAIN:   The LM is a moderate caliber vessel.   The left main arises normally from the left coronary cusp and has a  minimal (<25%) stenosis composed of mixed plaque.    LEFT ANTERIOR DESCENDING:   The LAD is a moderate caliber vessel. It has a type III morphology. It  gives rise to small caliber D1,D2 and D3.     Proximal LAD: Minimal (<25%) stenosis composed of noncalcified plaque.  Mid LAD: Minimal (<25%) stenosis composed of calcified plaque..  The remainder of the left anterior descending and its major diagonal  branches are patent with minimal luminal irregularities.    LEFT CIRCUMFLEX:   The LCx is a small caliber vessel. It gives rise to a small caliber  OM1  Proximal LCX: Minimal (<25%) stenosis composed of mixed plaque.  Mid LCX: Minimal (<25%) stenosis composed of mixed plaque.  The remainder of the left circumflex and its major marginal branches  are patent with minimal luminal irregularities.    RIGHT CORONARY ARTERY:   The RCA is a moderate caliber vessel.    Proximal RCA: Mild (25-49%) stenosis composed of mixed plaque.  Mid RCA: : Minimal (<25%) stenosis composed of calcified plaque  Distal RCA: This segment is non diagnostic due to cardiac motion  artifact    ADDITIONAL FINDINGS:   The proximal ascending aorta is normal in size.   Normal pulmonary venous anatomy with all four pulmonary veins draining  into the left atrium.    The left atrial appendage is free of thrombus.  There is no left ventricular mass or thrombus.   Normal pericardial thickness. There is no pericardial effusion.  Please review the separate Radiology report for incidental noncardiac  findings.    I have personally reviewed the examination and initial interpretation  and I agree with the findings.    FRANCINE MOULTON MD         SYSTEM ID:  [id number]       Discharge  Medications   Current Discharge Medication List        START taking these medications    Details   acetaminophen (TYLENOL) 325 MG tablet Take 3 tablets (975 mg) by mouth every 6 hours as needed for mild pain or headaches.  Qty: 30 tablet, Refills: 1    Associated Diagnoses: Influenza A      albuterol (PROAIR HFA/PROVENTIL HFA/VENTOLIN HFA) 108 (90 Base) MCG/ACT inhaler Inhale 2 puffs into the lungs every 6 hours as needed for shortness of breath, wheezing or cough.  Qty: 68 g, Refills: 3    Comments: Pharmacy may dispense brand covered by insurance (Proair, or proventil or ventolin or generic albuterol inhaler)  Associated Diagnoses: Wheeze      apixaban ANTICOAGULANT (ELIQUIS) 5 MG tablet Take 1 tablet (5 mg) by mouth 2 times daily.  Qty: 30 tablet, Refills: 2    Associated Diagnoses: Paroxysmal A-fib (H)      atorvastatin (LIPITOR) 10 MG tablet Take 1 tablet (10 mg) by mouth every evening.  Qty: 90 tablet, Refills: 2    Associated Diagnoses: Coronary artery disease involving native coronary artery of native heart without angina pectoris      cefpodoxime (VANTIN) 200 MG tablet Take 1 tablet (200 mg) by mouth 2 times daily for 5 days.  Qty: 10 tablet, Refills: 0    Associated Diagnoses: Heart failure with reduced ejection fraction, NYHA class III (H); Pneumonia due to infectious organism, unspecified laterality, unspecified part of lung      ipratropium - albuterol 0.5 mg/2.5 mg/3 mL (DUONEB) 0.5-2.5 (3) MG/3ML neb solution Take 1 vial (3 mLs) by nebulization every 4 hours as needed for wheezing.  Qty: 90 mL, Refills: 2    Associated Diagnoses: Pneumonia due to infectious organism, unspecified laterality, unspecified part of lung      losartan (COZAAR) 25 MG tablet Take 1 tablet (25 mg) by mouth daily.  Qty: 30 tablet, Refills: 2    Associated Diagnoses: Heart failure with reduced ejection fraction, NYHA class III (H)      metoprolol succinate ER (TOPROL XL) 25 MG 24 hr tablet Take 2 tablets (50 mg) by mouth  daily.  Qty: 30 tablet, Refills: 3    Associated Diagnoses: Heart failure with reduced ejection fraction, NYHA class III (H); Paroxysmal A-fib (H)      oseltamivir (TAMIFLU) 75 MG capsule Take 1 capsule (75 mg) by mouth 2 times daily for 4 days.  Qty: 8 capsule, Refills: 0    Associated Diagnoses: Influenza A           Allergies   No Known Allergies

## 2024-12-29 NOTE — PROGRESS NOTES
Care Management Discharge Note    Discharge Date: 12/29/2024     Discharge Disposition: Home    Discharge Services: None    Discharge DME: None    Discharge Transportation: car, drives self (Car located on [place])    Private pay costs discussed: Not applicable    Does the patient's insurance plan have a 3 day qualifying hospital stay waiver?  Yes     Which insurance plan 3 day waiver is available? Alternative insurance waiver    Will the waiver be used for post-acute placement? Undetermined at this time    PAS Confirmation Code:    Patient/family educated on Medicare website which has current facility and service quality ratings: no    Education Provided on the Discharge Plan: Yes  Persons Notified of Discharge Plans: Patient  Patient/Family in Agreement with the Plan: yes    Handoff Referral Completed: No, handoff not indicated or clinically appropriate    Additional Information:    Care Coordinator completed assisted with determining plan for transportation to home, per bedside RN request. This writer met with patient at bedside and was informed that his vehicle had been picked up from [place] parking today and brought home by family members. Due to this he is seeking resources, or assistance in how to get home.    RNCC contacted medical insurance transportation line for his health plan, however they are closed today (Sunday).  Per CMA conducted on 12/27/24 patient does not have financial needs at this time. In addition, patient is able to ambulate independently and does not qualify for a med-cab at this time.     Patient given the phone number for [employer] service:  [phone]    Also, patient reported feeling unsure of how to navigate to the exit. Staff informed that he will need an escort at discharge. Bedside nurse, LATRELL Proctor RN, informed.    Patient will discharge to home via [employer] which he can call and request through his cell phone, or hospital room phone, when he is ready to  discharge.     This writer has reviewed the most recent EMR communications. It has been a pleasure participating in the care of this patient.     Kiana Leon, RN, BSN, PHN, FCN  Nursing Care Coordinator (RNCC) Casual/Float  Acute Care Management (ACM)  Covering for UNIT 7C, [facility]  PHONE [phone]  [email or web address]    SEARCHABLE in AMCOM - search CARE COORDINATOR   [place] [facility] ([id number]) Saturday & Sunday; ([phone]) [facility] Recognized Holidays   Units: 5A Onc 5201 - 5219 RNCC,  5A Onc 5220 thru 5240 RNCC, 5C OFFSERVICE [phone] RNCC & 5C OFF SERVICE [id number] RNCC   Units: 6B Vocera, 6C Card 6401 thru 6420 RNCC, 6C Card 6502 thru 6514 RNCC & 6C Card 6515 thru 6519 RNCC    Units: 7A SOT RNCC Vocera, 7B Med Surg Vocera, & 7C Med Surg 7502 thru 7521 RNCC   Units: 6A Vocera & 4A CVICU Vocera, 4C MICU Vocera, and 4E SICU Vocera     Units: 5 Ortho Vocera & 5 Med Surg Vocera    Units: 6 Med Surg Vocera & 8 Med Surg Vocera

## 2024-12-30 ENCOUNTER — PATIENT OUTREACH (OUTPATIENT)
Dept: CARE COORDINATION | Facility: CLINIC | Age: 74
End: 2024-12-30
Payer: COMMERCIAL

## 2024-12-30 ENCOUNTER — TELEPHONE (OUTPATIENT)
Dept: CARDIOLOGY | Facility: CLINIC | Age: 74
End: 2024-12-30
Payer: COMMERCIAL

## 2024-12-30 LAB
ATRIAL RATE - MUSE: 122 BPM
ATRIAL RATE - MUSE: 98 BPM
ATRIAL RATE - MUSE: NORMAL BPM
DIASTOLIC BLOOD PRESSURE - MUSE: NORMAL MMHG
INTERPRETATION ECG - MUSE: NORMAL
P AXIS - MUSE: 37 DEGREES
P AXIS - MUSE: 74 DEGREES
P AXIS - MUSE: NORMAL DEGREES
PR INTERVAL - MUSE: 184 MS
PR INTERVAL - MUSE: 208 MS
PR INTERVAL - MUSE: NORMAL MS
QRS DURATION - MUSE: 124 MS
QRS DURATION - MUSE: 132 MS
QRS DURATION - MUSE: 136 MS
QT - MUSE: 312 MS
QT - MUSE: 354 MS
QT - MUSE: 408 MS
QTC - MUSE: 486 MS
QTC - MUSE: 504 MS
QTC - MUSE: 520 MS
R AXIS - MUSE: -16 DEGREES
R AXIS - MUSE: 1 DEGREES
R AXIS - MUSE: 16 DEGREES
SYSTOLIC BLOOD PRESSURE - MUSE: NORMAL MMHG
T AXIS - MUSE: 146 DEGREES
T AXIS - MUSE: 148 DEGREES
T AXIS - MUSE: 157 DEGREES
VENTRICULAR RATE- MUSE: 122 BPM
VENTRICULAR RATE- MUSE: 146 BPM
VENTRICULAR RATE- MUSE: 98 BPM

## 2024-12-30 RX ORDER — ALBUTEROL SULFATE 90 UG/1
2 INHALANT RESPIRATORY (INHALATION) EVERY 4 HOURS PRN
Qty: 18 G | Refills: 3 | Status: SHIPPED | OUTPATIENT
Start: 2024-12-30

## 2024-12-30 RX ORDER — ALBUTEROL SULFATE 90 UG/1
2 INHALANT RESPIRATORY (INHALATION) EVERY 6 HOURS PRN
OUTPATIENT
Start: 2024-12-30

## 2024-12-30 NOTE — PROGRESS NOTES
Clinic Care Coordination Contact  Care Team Conversations    Spoke with Pharmacy who shares that the nurse came and got the medications and they were not aware that patient did not have a nebulizer. Writer asked about Albuterol script and at first they stated they didn't see anything but than found that it had been sent to a mail order Pharmacy, [facility]. They suggested I reach out to Dr Le and ask for nebulizer script.    Spoke with [facility] and was told the Albuterol is processing and patient should receive within 5-7 days    Writer sent message to Dr. Le to see if he can send script for a nebulizer, or, send Albuterol script to a local pharmacy.    Addendum 12/31:  Called and rescheduled appt to virtual for today. However, patient shares that the doctor contacted patient directly and was able to fill the inhaler and patient has already picked up. Patient would still like to go into clinic and establish with PCP. Writer will call back and schedule.    Called and scheduled patient for January 10th 9:30am with check in time with 9:10am YADI Mcgee   Social Work Clinic Care Coordinator   [facility]  PH: [phone]  [email or web address]

## 2024-12-30 NOTE — PROGRESS NOTES
Clinic Care Coordination Contact  Transitions of Care Outreach  Chief Complaint   Patient presents with    Clinic Care Coordination - Post Hospital       Most Recent Admission Date: 12/26/2024   Most Recent Admission Diagnosis: Acute hypoxemic respiratory failure (H) - J96.01     Most Recent Discharge Date: 12/29/2024   Most Recent Discharge Diagnosis: Acute hypoxemic respiratory failure (H) - J96.01  SVT (supraventricular tachycardia) (H) - I47.10  Coronary artery disease involving native coronary artery of native heart without angina pectoris - I25.10  Heart failure with reduced ejection fraction, NYHA class III (H) - I50.20  Paroxysmal A-fib (H) - I48.0  Influenza A - J10.1  Pneumonia due to infectious organism, unspecified laterality, unspecified part of lung - J18.9  Wheeze - R06.2  CARDIOVASCULAR SCREENING; LDL GOAL LESS THAN 160 - Z13.6     Transitions of Care Assessment    Discharge Assessment  How are you doing now that you are home?: Patient shares that he is doing well, However, patient shares that he discharged home with nebulizer viles but does not have a nebulizer. Patient thinks he should have an inhaler instead. Patient does not have a PCP but is open to writer scheduling with a provider in the [facility]. Writer will also check in with discharging Pharmacy at [facility]  How are your symptoms? (Red Flag symptoms escalate to triage hotline per guidelines): Improved  Do you know how to contact your clinic care team if you have future questions or changes to your health status? : Yes  Does the patient have their discharge instructions? : Yes  Does the patient have questions regarding their discharge instructions? : No  Were you started on any new medications or were there changes to any of your previous medications? : Yes  Does the patient have all of their medications?: Yes  Do you have questions regarding any of your medications? : No  Do you have all of your needed medical supplies or  equipment (DME)?  (i.e. oxygen tank, CPAP, cane, etc.): Yes    Post-op ([facility] CTA Only)  If the patient had a surgery or procedure, do they have any questions for a nurse?: No    Post-op (Clinicians Only)  Did the patient have surgery or a procedure: No        Follow up Plan     Discharge Follow-Up  Discharge follow up appointment scheduled in alignment with recommended follow up timeframe or Transitions of Risk Category? (Low = within 30 days; Moderate= within 14 days; High= within 7 days): No  Patient's follow up appointment not scheduled: Patient accepted scheduling support. Appt scheduled/requested per protocol.    Future Appointments   Date Time Provider Department Center   1/6/2025  3:40 PM Marcelino Hamlin PA-C [facility]       Outpatient Plan as outlined on AVS reviewed with patient.    For any urgent concerns, please contact our 24 hour nurse triage line: [phone])       YADI Salgado

## 2024-12-30 NOTE — TELEPHONE ENCOUNTER
"Received urgent request for heart failure consult.  Pt was recently hospitalized and was seen by inpatient Cardiology consult service. See their note copied below from 12/27/24:    \"Patient found to have LVEF 35-40% with base and mid anteroseptal hypokinesis on echo. He was transferred to [facility] and CTA coronary showed mild nonobstructive disease. His moderately reduced EF could be secondary to stress in the setting of profound hypoxemia on arrival, tachycardia, and pneumonia. We expect there may be recovery without development of clinical heart failure. He also has risk factors for cardiomyopathy given history of cocaine use and alcohol intake. It would be helpful for patient to be seen in cardiology clinic on discharge (referral order placed for you already). Recommend discharging on metoprolol succinate 25 mg and losartan 25 mg daily, which can likely be discontinued if repeat echo shows recovery.\"    Unfortunately, we do not have any heart failure out patient availability within 1-2 weeks due to the holiday.  Based on the information above and that patient does not appear to have clinical heart failure symptoms, we will schedule him with our next available provider within a couple weeks. We will call patient to discuss and schedule.    1/3/24 - Spoke with patient.  Offered appt on 1/24 with Dr. Roche with labs prior.      "

## 2024-12-30 NOTE — TELEPHONE ENCOUNTER
[facility] Call Center    Phone Message    May a detailed message be left on voicemail: yes     Reason for Call: Appointment Intake    Referring Provider Name:     Miguel Le MD     Diagnosis and/or Symptoms:   Heart failure with reduced ejection fraction, NYHA class III (H) [I50.20]     Urgent HF referral- please call pt to schedule.        Action Taken: Message routed to:  [facility] cardio    Travel Screening: Not Applicable     Date of Service:

## 2024-12-31 LAB
BACTERIA BLD CULT: NO GROWTH
BACTERIA BLD CULT: NO GROWTH

## 2025-01-10 PROBLEM — I50.20 HEART FAILURE WITH REDUCED EJECTION FRACTION (H): Status: ACTIVE | Noted: 2025-01-10

## 2025-01-11 ENCOUNTER — NURSE TRIAGE (OUTPATIENT)
Dept: NURSING | Facility: CLINIC | Age: 75
End: 2025-01-11
Payer: COMMERCIAL

## 2025-01-11 NOTE — TELEPHONE ENCOUNTER
Nurse Triage SBAR    Is this a 2nd Level Triage? NO    Situation: differing blood pressure readings on left and right arm.     Background: Patient saw the provider yesterday and was recommended to get an [employer] blood pressure cuff. Patient calling today as he is getting very different blood pressure readings from his left arm to his right arm and is wondering what to make of that.   Yesterday on his left arm he was high 70s over 59/60. On the right arm it was 107/60. Today left arm 95/67 and right arm 143/84. Heart rate has been in the 60s and 70s. Patient reports feeling fine. Denies any symptoms at all.  Patient recently diagnosed with heart failure with decreased EF of 35-45%. Patient has not had a follow up with cardiology yet.   Patient is on Eliquis, Atorvastatin, and metoprolol XR.   Protocol recommends see PCP within 24 hours.  Routing message to PCP for follow up Monday.    Patient care advice given to just gather information and document for PCP and cardiology provider. Encouraged daily morning weight after going to the bathroom each morning. Check blood pressure in the morning before taking medication and then again in the evening. Document readings and note left or right arm along with pulse.   If patient develops any symptoms he will call nurse line 24/7.     Assessment: guidance on blood pressure cuff with differing blood pressure results between left and right arm. Hold parameters for metoprolol for pulse and blood pressure.     Protocol Recommended Disposition:   See PCP Within 24 Hours    Recommendation: provider input on blood pressure differences on left and right arm, any hold parameters for metoprolol regarding pulse and blood pressure.      Routed to provider    Does the patient meet one of the following criteria for ADS visit consideration? 16+ years old, with an [facility] PCP                 Reason for Disposition   [1] Systolic BP  AND [2] taking blood pressure medications AND [3] NOT dizzy,  "lightheaded or weak     Routing to PCP for call back on Monday    Additional Information   Negative: Started suddenly after an allergic medicine, an allergic food, or bee sting   Negative: Shock suspected (e.g., cold/pale/clammy skin, too weak to stand, low BP, rapid pulse)   Negative: Difficult to awaken or acting confused (e.g., disoriented, slurred speech)   Negative: Fainted   Negative: [1] Systolic BP < 90 AND [2] dizzy, lightheaded, or weak   Negative: Chest pain   Negative: Bleeding (e.g., vomiting blood, rectal bleeding or tarry stools, severe vaginal bleeding)(Exception: Fainted from sight of small amount of blood; small cut or abrasion.)   Negative: Extra heartbeats, irregular heart beating, or heart is beating very fast  (i.e., \"palpitations\")   Negative: Sounds like a life-threatening emergency to the triager   Negative: [1] Systolic BP < 80 AND [2] NOT dizzy, lightheaded or weak   Negative: Abdominal pain   Negative: Fever > 100.4 F (38.0 C)   Negative: Major surgery in the past month   Negative: [1] Drinking very little AND [2] dehydration suspected (e.g., no urine > 12 hours, very dry mouth, very lightheaded)   Negative: [1] Fall in systolic BP > 20 mm Hg from normal AND [2] dizzy, lightheaded, or weak   Negative: Patient sounds very sick or weak to the triager   Negative: [1] Systolic BP < 90 AND [2] NOT dizzy, lightheaded or weak   Negative: [1] Systolic BP  AND [2] taking blood pressure medications AND [3] dizzy, lightheaded or weak    Protocols used: Blood Pressure - Low-A-AH    "

## 2025-01-13 NOTE — TELEPHONE ENCOUNTER
Provider Response to 2nd Level Triage Request    I have reviewed the RN documentation. My recommendation is:  As long as he is asymptomatic, we can watch this and reassess when he is in clinic.  He has Heart failure follow up on the 24th, have him bring his cuff with him to that appointment to check calibration.     If pressures continue to be variable, will order imaging to rule out arterial stenosis. If he develops acute symptoms of dizziness, numbness or weakness, chest pain, shortness of breath, etc.  Should present to the ED

## 2025-01-13 NOTE — TELEPHONE ENCOUNTER
Left message to call back and ask to speak with an available nurse.    HERMILA BecerraN, RN-BC  [facility]

## 2025-01-13 NOTE — TELEPHONE ENCOUNTER
Relayed plan of care from clinician. The patient indicates understanding of these issues and agrees with the plan.    STEVE Gonzalez, BSN, PHN, AMB-BC (she/her)  [facility] RN

## 2025-01-22 DIAGNOSIS — I50.20 HEART FAILURE WITH REDUCED EJECTION FRACTION (H): Primary | ICD-10-CM

## 2025-01-24 ENCOUNTER — LAB (OUTPATIENT)
Dept: LAB | Facility: CLINIC | Age: 75
End: 2025-01-24
Payer: COMMERCIAL

## 2025-01-24 DIAGNOSIS — I50.20 HEART FAILURE WITH REDUCED EJECTION FRACTION (H): ICD-10-CM

## 2025-01-24 LAB
ANION GAP SERPL CALCULATED.3IONS-SCNC: 9 MMOL/L (ref 7–15)
BUN SERPL-MCNC: 16 MG/DL (ref 8–23)
CALCIUM SERPL-MCNC: 9.2 MG/DL (ref 8.8–10.4)
CHLORIDE SERPL-SCNC: 103 MMOL/L (ref 98–107)
CREAT SERPL-MCNC: 1.04 MG/DL (ref 0.67–1.17)
EGFRCR SERPLBLD CKD-EPI 2021: 75 ML/MIN/1.73M2
GLUCOSE SERPL-MCNC: 93 MG/DL (ref 70–99)
HCO3 SERPL-SCNC: 25 MMOL/L (ref 22–29)
POTASSIUM SERPL-SCNC: 4.4 MMOL/L (ref 3.4–5.3)
SODIUM SERPL-SCNC: 137 MMOL/L (ref 135–145)

## 2025-01-24 PROCEDURE — 36415 COLL VENOUS BLD VENIPUNCTURE: CPT | Performed by: PATHOLOGY

## 2025-01-24 PROCEDURE — 80048 BASIC METABOLIC PNL TOTAL CA: CPT | Performed by: PATHOLOGY

## 2025-01-27 ENCOUNTER — TELEPHONE (OUTPATIENT)
Dept: CARDIOLOGY | Facility: CLINIC | Age: 75
End: 2025-01-27
Payer: COMMERCIAL

## 2025-01-27 NOTE — TELEPHONE ENCOUNTER
30 days supply test claims requested for the drugs below:  Xarelto 20mg daily, Pradaxa 150mg BID      Xarelto 20mg daily  -$47 copay         Pradaxa 150mg BID  -PA needed, 1 time transitional fill only

## 2025-01-27 NOTE — TELEPHONE ENCOUNTER
Returned call to [facility].  Eliquis going to run out soon. Got refill.  Very expensive for 30 days.     Will check into other DOACs to assess if another one is covered at a better rate and then will get back to [place].

## 2025-01-27 NOTE — TELEPHONE ENCOUNTER
[facility] Call Center    Phone Message    May a detailed message be left on voicemail: yes     Reason for Call:   called requesting to speak with his care team but didn't leave any details. Please reach out to  to discuss. Thank you!    Action Taken: Other: Cardiology    Travel Screening: Not Applicable    Thank you!  Specialty Access Center       Date of Service:

## 2025-01-29 NOTE — TELEPHONE ENCOUNTER
Discussed with pharmacy team.  Likely copay was higher due to needing to meet deductible, anticipate further copays to be around $47/month ($141/90 days).   Called [facility]  Left message with above information, asked for call back with questions.    Will plan to call pharmacy in ~3 weeks to ensure refill cost is as expected.

## 2025-02-10 ENCOUNTER — CARE COORDINATION (OUTPATIENT)
Dept: CARDIOLOGY | Facility: CLINIC | Age: 75
End: 2025-02-10
Payer: COMMERCIAL

## 2025-02-10 NOTE — PROGRESS NOTES
Called  to check in on home blood pressures and symptoms after starting losartan as well as review lab results.  Left message asking for call back.

## 2025-02-10 NOTE — PROGRESS NOTES
called back. Reviewed he states he is doing fine. He has some complaints about getting a hold of the clinic as he 'lost the voicemail' that we left him. Reviewed correct phone numbers.   He states after starting losartan he has noticed no changes. He feels well.   Reports morning blood pressures of 140-160 systolic and afternoon/evening blood pressures of 110-120 systolic.   He is unable to tell me if the morning blood pressures are usually before or after medications and he is unsure when he takes his losartan (morning vs night). He states he has to go up and down some stairs in the mornings and he drinks coffee in the mornings which he thinks may contribute to the higher blood pressures.  Reviewed he should check blood pressures about an hour after taking medications so we can see effect of medications.     Reviewed lab results. No further questions at this time.

## 2025-02-18 ENCOUNTER — CARE COORDINATION (OUTPATIENT)
Dept: CARDIOLOGY | Facility: CLINIC | Age: 75
End: 2025-02-18
Payer: COMMERCIAL

## 2025-02-18 DIAGNOSIS — I50.20 HEART FAILURE WITH REDUCED EJECTION FRACTION, NYHA CLASS III (H): ICD-10-CM

## 2025-02-18 DIAGNOSIS — I48.0 PAROXYSMAL A-FIB (H): ICD-10-CM

## 2025-02-18 RX ORDER — METOPROLOL SUCCINATE 25 MG/1
50 TABLET, EXTENDED RELEASE ORAL DAILY
Qty: 180 TABLET | Refills: 3 | Status: SHIPPED | OUTPATIENT
Start: 2025-02-18

## 2025-02-18 NOTE — PROGRESS NOTES
Called pharmacy to check on cost for eliquis.  Was on hold multiple times for over 20 minutes.    Spoke with pharmacist. No active refills on file for eliquis.   Resent new rx.     Returned call to pharmacy. They confirm cost is $141/90 days supply.     Called [facility]. Reviewed above. He states understanding, states he can afford the new cost. No questions at this time.

## 2025-04-24 ENCOUNTER — DOCUMENTATION ONLY (OUTPATIENT)
Dept: CARDIOLOGY | Facility: CLINIC | Age: 75
End: 2025-04-24

## 2025-04-24 DIAGNOSIS — I50.20 HEART FAILURE WITH REDUCED EJECTION FRACTION (H): Primary | ICD-10-CM

## 2025-04-25 ENCOUNTER — ANCILLARY PROCEDURE (OUTPATIENT)
Dept: CARDIOLOGY | Facility: CLINIC | Age: 75
End: 2025-04-25
Attending: STUDENT IN AN ORGANIZED HEALTH CARE EDUCATION/TRAINING PROGRAM
Payer: COMMERCIAL

## 2025-04-25 ENCOUNTER — ANCILLARY PROCEDURE (OUTPATIENT)
Dept: ULTRASOUND IMAGING | Facility: CLINIC | Age: 75
End: 2025-04-25
Attending: NURSE PRACTITIONER
Payer: COMMERCIAL

## 2025-04-25 DIAGNOSIS — Z13.6 SCREENING FOR AAA (ABDOMINAL AORTIC ANEURYSM): ICD-10-CM

## 2025-04-25 DIAGNOSIS — I50.21 ACUTE SYSTOLIC HEART FAILURE (H): ICD-10-CM

## 2025-04-25 LAB
LVEF ECHO: NORMAL
RADIOLOGIST FLAGS: NORMAL

## 2025-04-25 PROCEDURE — 76775 US EXAM ABDO BACK WALL LIM: CPT | Performed by: RADIOLOGY

## 2025-04-25 PROCEDURE — 93306 TTE W/DOPPLER COMPLETE: CPT | Performed by: INTERNAL MEDICINE

## 2025-04-25 RX ADMIN — Medication 6 ML: at 12:08

## 2025-05-01 ENCOUNTER — TELEPHONE (OUTPATIENT)
Dept: CARDIOLOGY | Facility: CLINIC | Age: 75
End: 2025-05-01
Payer: COMMERCIAL

## 2025-05-01 NOTE — TELEPHONE ENCOUNTER
Left Voicemail (1st Attempt) and Sent Mychart (1st Attempt) for the patient to call back and schedule the following:    Appointment type: ENROLLMENT CORE  Provider: Dr. Reagan  Return date: October or next available  Specialty phone number: [phone] opt [facility]  Additional appointment(s) needed: Lab  Additonal Notes: Rescheduling 10/24/25 to next available

## 2025-05-10 ENCOUNTER — RESULTS FOLLOW-UP (OUTPATIENT)
Dept: CARDIOLOGY | Facility: CLINIC | Age: 75
End: 2025-05-10

## 2025-05-15 ENCOUNTER — RESULTS FOLLOW-UP (OUTPATIENT)
Dept: INTERNAL MEDICINE | Facility: CLINIC | Age: 75
End: 2025-05-15

## 2025-05-16 ENCOUNTER — RESULTS FOLLOW-UP (OUTPATIENT)
Dept: FAMILY MEDICINE | Facility: CLINIC | Age: 75
End: 2025-05-16

## 2025-05-16 PROBLEM — I10 HTN (HYPERTENSION): Status: ACTIVE | Noted: 2025-04-26

## 2025-05-16 PROBLEM — I25.10 CORONARY ATHEROSCLEROSIS: Status: ACTIVE | Noted: 2025-04-26

## 2025-05-16 PROBLEM — R41.82 AMS (ALTERED MENTAL STATUS): Status: ACTIVE | Noted: 2025-04-26

## 2025-05-19 ENCOUNTER — TELEPHONE (OUTPATIENT)
Dept: CARDIOLOGY | Facility: CLINIC | Age: 75
End: 2025-05-19
Payer: COMMERCIAL

## 2025-05-19 NOTE — TELEPHONE ENCOUNTER
Patient Contacted for the patient to call back and schedule the following:    Appointment type: CORE ENROLLMENT  Provider: Bella Alonzo  Return date: 10/28/25  Specialty phone number: [phone] opt [facility]  Additional appointment(s) needed: Labs  Additonal Notes: Pt said he didn't want to reschedule his appt and to keep the 10/28 appt

## 2025-06-14 ENCOUNTER — MYC REFILL (OUTPATIENT)
Dept: CARDIOLOGY | Facility: CLINIC | Age: 75
End: 2025-06-14
Payer: COMMERCIAL

## 2025-06-14 DIAGNOSIS — I50.22 CHRONIC SYSTOLIC HEART FAILURE (H): ICD-10-CM

## 2025-06-14 RX ORDER — LOSARTAN POTASSIUM 25 MG/1
50 TABLET ORAL DAILY
Qty: 180 TABLET | Refills: 3 | Status: CANCELLED | OUTPATIENT
Start: 2025-06-14

## 2025-06-16 NOTE — TELEPHONE ENCOUNTER
losartan (COZAAR) 25 MG tablet : refills on file  - Rx sent 4/25/2025 Disp:180 R:3 Pharmacy: [id number]   - message sent to patient

## 2025-06-28 ENCOUNTER — MYC REFILL (OUTPATIENT)
Dept: FAMILY MEDICINE | Facility: CLINIC | Age: 75
End: 2025-06-28
Payer: COMMERCIAL

## 2025-06-28 DIAGNOSIS — R56.9 SEIZURES (H): Primary | ICD-10-CM

## 2025-07-01 RX ORDER — PREGABALIN 100 MG/1
CAPSULE ORAL
Qty: 270 CAPSULE | Refills: 0 | Status: SHIPPED | OUTPATIENT
Start: 2025-07-01

## 2025-07-01 NOTE — TELEPHONE ENCOUNTER
Clinic RN: Please contact patient because LAST ORDER IS HISTORICAL AND the last prescription was a taper dose (not in RN protocol). We need to know what dose patient is taking. Determine the current dose, then route to provider and ask provider to update the SIG and approve or deny the prescription.    
I will provide 1 refill, but he needs to follow up with neurology as this was recently prescribed for seizure activity.  They will need to manage future refills.     He also needs to come into clinic for BP check given recent elevations at hospital follow up. Does he monitor at home at all?  
Writer responded via [employer].  HERMILA BecerraN, RN-BC  [facility]    
Writer responded via [employer].  HERMILA BecerraN, RN-BC  [facility]    
No

## 2025-07-31 ENCOUNTER — MYC REFILL (OUTPATIENT)
Dept: FAMILY MEDICINE | Facility: CLINIC | Age: 75
End: 2025-07-31
Payer: COMMERCIAL

## 2025-07-31 DIAGNOSIS — R56.9 SEIZURES (H): ICD-10-CM

## 2025-07-31 RX ORDER — PREGABALIN 100 MG/1
CAPSULE ORAL
Qty: 270 CAPSULE | Refills: 0 | OUTPATIENT
Start: 2025-07-31

## 2025-08-04 ENCOUNTER — MYC REFILL (OUTPATIENT)
Dept: FAMILY MEDICINE | Facility: CLINIC | Age: 75
End: 2025-08-04
Payer: COMMERCIAL

## 2025-08-04 DIAGNOSIS — R56.9 SEIZURES (H): ICD-10-CM

## 2025-08-04 RX ORDER — PREGABALIN 100 MG/1
CAPSULE ORAL
Qty: 270 CAPSULE | Refills: 0 | OUTPATIENT
Start: 2025-08-04

## 2025-08-04 RX ORDER — PREGABALIN 100 MG/1
CAPSULE ORAL
Qty: 270 CAPSULE | Refills: 1 | Status: SHIPPED | OUTPATIENT
Start: 2025-08-04

## (undated) RX ORDER — IVABRADINE 5 MG/1
TABLET, FILM COATED ORAL
Status: DISPENSED
Start: 2024-12-27

## (undated) RX ORDER — NITROGLYCERIN 0.4 MG/1
TABLET SUBLINGUAL
Status: DISPENSED
Start: 2024-12-27

## (undated) RX ORDER — METOPROLOL TARTRATE 1 MG/ML
INJECTION, SOLUTION INTRAVENOUS
Status: DISPENSED
Start: 2024-12-27